# Patient Record
Sex: FEMALE | Race: WHITE | HISPANIC OR LATINO | Employment: OTHER | URBAN - METROPOLITAN AREA
[De-identification: names, ages, dates, MRNs, and addresses within clinical notes are randomized per-mention and may not be internally consistent; named-entity substitution may affect disease eponyms.]

---

## 2018-06-05 ENCOUNTER — HOSPITAL ENCOUNTER (INPATIENT)
Facility: HOSPITAL | Age: 58
LOS: 2 days | Discharge: HOME | DRG: 247 | End: 2018-06-07
Attending: EMERGENCY MEDICINE | Admitting: INTERNAL MEDICINE
Payer: COMMERCIAL

## 2018-06-05 DIAGNOSIS — R11.2 NAUSEA AND VOMITING, INTRACTABILITY OF VOMITING NOT SPECIFIED, UNSPECIFIED VOMITING TYPE: ICD-10-CM

## 2018-06-05 DIAGNOSIS — R00.1 BRADYCARDIA: ICD-10-CM

## 2018-06-05 DIAGNOSIS — I21.4 NSTEMI (NON-ST ELEVATED MYOCARDIAL INFARCTION): Primary | ICD-10-CM

## 2018-06-05 DIAGNOSIS — Z72.0 TOBACCO ABUSE: ICD-10-CM

## 2018-06-05 DIAGNOSIS — R07.9 CHEST PAIN, UNSPECIFIED TYPE: ICD-10-CM

## 2018-06-05 DIAGNOSIS — E11.9 TYPE 2 DIABETES MELLITUS WITHOUT COMPLICATION, WITHOUT LONG-TERM CURRENT USE OF INSULIN: ICD-10-CM

## 2018-06-05 DIAGNOSIS — R53.83 OTHER FATIGUE: ICD-10-CM

## 2018-06-05 DIAGNOSIS — E11.9 TYPE 2 DIABETES MELLITUS: ICD-10-CM

## 2018-06-05 DIAGNOSIS — R07.9 CHEST PAIN: ICD-10-CM

## 2018-06-05 DIAGNOSIS — R79.89 ELEVATED TROPONIN: ICD-10-CM

## 2018-06-05 DIAGNOSIS — R73.9 HYPERGLYCEMIA: ICD-10-CM

## 2018-06-05 DIAGNOSIS — R00.1 SYMPTOMATIC BRADYCARDIA: ICD-10-CM

## 2018-06-05 DIAGNOSIS — I25.10 CORONARY ARTERY DISEASE INVOLVING NATIVE CORONARY ARTERY, ANGINA PRESENCE UNSPECIFIED, UNSPECIFIED WHETHER NATIVE OR TRANSPLANTED HEART: ICD-10-CM

## 2018-06-05 DIAGNOSIS — R55 NEAR SYNCOPE: ICD-10-CM

## 2018-06-05 LAB
ALBUMIN SERPL BCP-MCNC: 4.1 G/DL
ALP SERPL-CCNC: 137 U/L
ALT SERPL W/O P-5'-P-CCNC: 19 U/L
ANION GAP SERPL CALC-SCNC: 12 MMOL/L
AST SERPL-CCNC: 17 U/L
B-OH-BUTYR BLD STRIP-SCNC: 0.1 MMOL/L
BACTERIA #/AREA URNS HPF: NORMAL /HPF
BILIRUB SERPL-MCNC: 0.5 MG/DL
BILIRUB UR QL STRIP: NEGATIVE
BNP SERPL-MCNC: 19 PG/ML
BUN SERPL-MCNC: 17 MG/DL
CALCIUM SERPL-MCNC: 10.2 MG/DL
CHLORIDE SERPL-SCNC: 99 MMOL/L
CK SERPL-CCNC: 163 U/L
CLARITY UR: CLEAR
CO2 SERPL-SCNC: 24 MMOL/L
COLOR UR: YELLOW
CREAT SERPL-MCNC: 0.9 MG/DL
DIASTOLIC DYSFUNCTION: NO
ERYTHROCYTE [DISTWIDTH] IN BLOOD BY AUTOMATED COUNT: 12.3 %
EST. GFR  (AFRICAN AMERICAN): >60 ML/MIN/1.73 M^2
EST. GFR  (NON AFRICAN AMERICAN): >60 ML/MIN/1.73 M^2
ESTIMATED AVG GLUCOSE: 220 MG/DL
ESTIMATED PA SYSTOLIC PRESSURE: 6.39
GLUCOSE SERPL-MCNC: 314 MG/DL (ref 70–110)
GLUCOSE SERPL-MCNC: 535 MG/DL
GLUCOSE UR QL STRIP: ABNORMAL
HBA1C MFR BLD HPLC: 9.3 %
HCT VFR BLD AUTO: 44 %
HGB BLD-MCNC: 14.6 G/DL
HGB UR QL STRIP: ABNORMAL
KETONES UR QL STRIP: NEGATIVE
LEUKOCYTE ESTERASE UR QL STRIP: NEGATIVE
MCH RBC QN AUTO: 31.8 PG
MCHC RBC AUTO-ENTMCNC: 33.2 G/DL
MCV RBC AUTO: 96 FL
MICROSCOPIC COMMENT: NORMAL
MITRAL VALVE MOBILITY: NORMAL
NITRITE UR QL STRIP: NEGATIVE
PH UR STRIP: 6 [PH] (ref 5–8)
PLATELET # BLD AUTO: 255 K/UL
PMV BLD AUTO: 10 FL
POCT GLUCOSE: 188 MG/DL (ref 70–110)
POCT GLUCOSE: 233 MG/DL (ref 70–110)
POCT GLUCOSE: 314 MG/DL (ref 70–110)
POTASSIUM SERPL-SCNC: 4.3 MMOL/L
PROT SERPL-MCNC: 7.5 G/DL
PROT UR QL STRIP: NEGATIVE
RBC # BLD AUTO: 4.59 M/UL
RBC #/AREA URNS HPF: 2 /HPF (ref 0–4)
RETIRED EF AND QEF - SEE NOTES: 65 (ref 55–65)
SODIUM SERPL-SCNC: 135 MMOL/L
SP GR UR STRIP: 1.01 (ref 1–1.03)
TRICUSPID VALVE REGURGITATION: NORMAL
TROPONIN I SERPL DL<=0.01 NG/ML-MCNC: 0.12 NG/ML
TROPONIN I SERPL DL<=0.01 NG/ML-MCNC: 0.78 NG/ML
TROPONIN I SERPL DL<=0.01 NG/ML-MCNC: 1.79 NG/ML
URN SPEC COLLECT METH UR: ABNORMAL
UROBILINOGEN UR STRIP-ACNC: NEGATIVE EU/DL
WBC # BLD AUTO: 9.73 K/UL
YEAST URNS QL MICRO: NORMAL

## 2018-06-05 PROCEDURE — 83036 HEMOGLOBIN GLYCOSYLATED A1C: CPT

## 2018-06-05 PROCEDURE — 84484 ASSAY OF TROPONIN QUANT: CPT

## 2018-06-05 PROCEDURE — 93306 TTE W/DOPPLER COMPLETE: CPT | Mod: 26,,, | Performed by: INTERNAL MEDICINE

## 2018-06-05 PROCEDURE — 82550 ASSAY OF CK (CPK): CPT

## 2018-06-05 PROCEDURE — 93010 ELECTROCARDIOGRAM REPORT: CPT | Mod: ,,, | Performed by: INTERNAL MEDICINE

## 2018-06-05 PROCEDURE — 85027 COMPLETE CBC AUTOMATED: CPT

## 2018-06-05 PROCEDURE — 82962 GLUCOSE BLOOD TEST: CPT

## 2018-06-05 PROCEDURE — 93306 TTE W/DOPPLER COMPLETE: CPT

## 2018-06-05 PROCEDURE — 25000003 PHARM REV CODE 250: Performed by: EMERGENCY MEDICINE

## 2018-06-05 PROCEDURE — 82010 KETONE BODYS QUAN: CPT

## 2018-06-05 PROCEDURE — 81000 URINALYSIS NONAUTO W/SCOPE: CPT

## 2018-06-05 PROCEDURE — 25000003 PHARM REV CODE 250: Performed by: NURSE PRACTITIONER

## 2018-06-05 PROCEDURE — 83880 ASSAY OF NATRIURETIC PEPTIDE: CPT

## 2018-06-05 PROCEDURE — 80053 COMPREHEN METABOLIC PANEL: CPT

## 2018-06-05 PROCEDURE — 96360 HYDRATION IV INFUSION INIT: CPT

## 2018-06-05 PROCEDURE — 20000000 HC ICU ROOM

## 2018-06-05 PROCEDURE — 99285 EMERGENCY DEPT VISIT HI MDM: CPT | Mod: 25

## 2018-06-05 PROCEDURE — 93005 ELECTROCARDIOGRAM TRACING: CPT

## 2018-06-05 PROCEDURE — 96361 HYDRATE IV INFUSION ADD-ON: CPT

## 2018-06-05 PROCEDURE — 84484 ASSAY OF TROPONIN QUANT: CPT | Mod: 91

## 2018-06-05 PROCEDURE — 63600175 PHARM REV CODE 636 W HCPCS: Performed by: NURSE PRACTITIONER

## 2018-06-05 RX ORDER — INSULIN ASPART 100 [IU]/ML
0-5 INJECTION, SOLUTION INTRAVENOUS; SUBCUTANEOUS
Status: DISCONTINUED | OUTPATIENT
Start: 2018-06-05 | End: 2018-06-07 | Stop reason: HOSPADM

## 2018-06-05 RX ORDER — PANTOPRAZOLE SODIUM 40 MG/1
40 TABLET, DELAYED RELEASE ORAL DAILY
Status: DISCONTINUED | OUTPATIENT
Start: 2018-06-06 | End: 2018-06-07 | Stop reason: HOSPADM

## 2018-06-05 RX ORDER — ATROPINE SULFATE 0.1 MG/ML
INJECTION INTRAVENOUS
Status: DISCONTINUED
Start: 2018-06-05 | End: 2018-06-05 | Stop reason: WASHOUT

## 2018-06-05 RX ORDER — SODIUM CHLORIDE 0.9 % (FLUSH) 0.9 %
3 SYRINGE (ML) INJECTION
Status: DISCONTINUED | OUTPATIENT
Start: 2018-06-05 | End: 2018-06-07 | Stop reason: HOSPADM

## 2018-06-05 RX ORDER — SODIUM CHLORIDE 9 MG/ML
INJECTION, SOLUTION INTRAVENOUS CONTINUOUS
Status: DISCONTINUED | OUTPATIENT
Start: 2018-06-05 | End: 2018-06-06

## 2018-06-05 RX ORDER — IBUPROFEN 200 MG
24 TABLET ORAL
Status: DISCONTINUED | OUTPATIENT
Start: 2018-06-05 | End: 2018-06-07 | Stop reason: HOSPADM

## 2018-06-05 RX ORDER — SODIUM CHLORIDE 9 MG/ML
1000 INJECTION, SOLUTION INTRAVENOUS
Status: COMPLETED | OUTPATIENT
Start: 2018-06-05 | End: 2018-06-05

## 2018-06-05 RX ORDER — GLUCAGON 1 MG
1 KIT INJECTION
Status: DISCONTINUED | OUTPATIENT
Start: 2018-06-05 | End: 2018-06-07 | Stop reason: HOSPADM

## 2018-06-05 RX ORDER — ATROPINE SULFATE 0.4 MG/ML
0.4 INJECTION, SOLUTION ENDOTRACHEAL; INTRAMEDULLARY; INTRAMUSCULAR; INTRAVENOUS; SUBCUTANEOUS EVERY 4 HOURS PRN
Status: DISCONTINUED | OUTPATIENT
Start: 2018-06-05 | End: 2018-06-07 | Stop reason: HOSPADM

## 2018-06-05 RX ORDER — IBUPROFEN 200 MG
16 TABLET ORAL
Status: DISCONTINUED | OUTPATIENT
Start: 2018-06-05 | End: 2018-06-07 | Stop reason: HOSPADM

## 2018-06-05 RX ORDER — POLYETHYLENE GLYCOL 3350 17 G/17G
17 POWDER, FOR SOLUTION ORAL 2 TIMES DAILY PRN
Status: DISCONTINUED | OUTPATIENT
Start: 2018-06-05 | End: 2018-06-07 | Stop reason: HOSPADM

## 2018-06-05 RX ORDER — ACETAMINOPHEN 325 MG/1
650 TABLET ORAL EVERY 4 HOURS PRN
Status: DISCONTINUED | OUTPATIENT
Start: 2018-06-05 | End: 2018-06-06

## 2018-06-05 RX ORDER — RAMELTEON 8 MG/1
8 TABLET ORAL NIGHTLY PRN
Status: DISCONTINUED | OUTPATIENT
Start: 2018-06-05 | End: 2018-06-07 | Stop reason: HOSPADM

## 2018-06-05 RX ADMIN — INSULIN ASPART 2 UNITS: 100 INJECTION, SOLUTION INTRAVENOUS; SUBCUTANEOUS at 05:06

## 2018-06-05 RX ADMIN — SODIUM CHLORIDE: 0.9 INJECTION, SOLUTION INTRAVENOUS at 06:06

## 2018-06-05 RX ADMIN — SODIUM CHLORIDE 1000 ML: 0.9 INJECTION, SOLUTION INTRAVENOUS at 02:06

## 2018-06-05 NOTE — ASSESSMENT & PLAN NOTE
-active smoker since age 20s  -reports 1/2ppd since that time  -discussed importance of complete smoking cessation

## 2018-06-05 NOTE — SUBJECTIVE & OBJECTIVE
No past medical history on file.    No past surgical history on file.    Review of patient's allergies indicates:  No Known Allergies    No current facility-administered medications on file prior to encounter.      No current outpatient prescriptions on file prior to encounter.     Family History     None        Social History Main Topics    Smoking status: Not on file    Smokeless tobacco: Not on file    Alcohol use Not on file    Drug use: Unknown    Sexual activity: Not on file     Review of Systems   Constitution: Negative for chills, decreased appetite, diaphoresis, fever and weakness.   Cardiovascular: Positive for chest pain. Negative for claudication, cyanosis, dyspnea on exertion, irregular heartbeat, leg swelling, near-syncope, orthopnea, palpitations, paroxysmal nocturnal dyspnea and syncope.   Respiratory: Negative for cough, hemoptysis, shortness of breath and wheezing.    Gastrointestinal: Negative for bloating, abdominal pain, constipation, diarrhea, melena, nausea and vomiting.   Neurological: Positive for dizziness.     Objective:     Vital Signs (Most Recent):  Temp: 98.3 °F (36.8 °C) (06/05/18 1301)  Pulse: (!) 48 (06/05/18 1430)  Resp: 16 (06/05/18 1430)  BP: (!) 95/53 (06/05/18 1430)  SpO2: 98 % (06/05/18 1430) Vital Signs (24h Range):  Temp:  [98.3 °F (36.8 °C)] 98.3 °F (36.8 °C)  Pulse:  [48-94] 48  Resp:  [16] 16  SpO2:  [98 %] 98 %  BP: ()/(53-67) 95/53     Weight: 59 kg (130 lb)  Body mass index is 21.63 kg/m².    SpO2: 98 %  O2 Device (Oxygen Therapy): room air    No intake or output data in the 24 hours ending 06/05/18 1526    Lines/Drains/Airways     Peripheral Intravenous Line                 Peripheral IV - Single Lumen 06/05/18 1301 Left Antecubital less than 1 day         Peripheral IV - Single Lumen 06/05/18 1305 Right Forearm less than 1 day                Physical Exam   Constitutional: She is oriented to person, place, and time. She appears well-developed and  well-nourished. No distress.   Cardiovascular: Normal rate and regular rhythm.  Exam reveals no gallop.    No murmur heard.  Pulmonary/Chest: Effort normal and breath sounds normal. No respiratory distress. She has no wheezes.   Abdominal: Soft. Bowel sounds are normal. She exhibits no distension. There is no tenderness.   Neurological: She is alert and oriented to person, place, and time.   Skin: Skin is warm and dry.       Significant Labs:       Recent Labs  Lab 06/05/18  1310   *   K 4.3   CL 99   CO2 24   BUN 17   CREATININE 0.9       Recent Labs  Lab 06/05/18  1310   WBC 9.73   RBC 4.59   HGB 14.6   HCT 44.0      MCV 96   MCH 31.8*   MCHC 33.2       Recent Labs  Lab 06/05/18  1310   TROPONINI 0.122*       Significant Imaging: Echocardiogram:   2D echo with color flow doppler:   Results for orders placed or performed during the hospital encounter of 06/05/18   2D echo with color flow doppler   Result Value Ref Range    EF 65 55 - 65    Diastolic Dysfunction No     Est. PA Systolic Pressure 6.39     Mitral Valve Mobility NORMAL     Tricuspid Valve Regurgitation TRIVIAL

## 2018-06-05 NOTE — ED PROVIDER NOTES
Encounter Date: 6/5/2018    SCRIBE #1 NOTE: I, Sushil Pham, am scribing for, and in the presence of,  Dr. Sheikh. I have scribed the entire note.       History     Chief Complaint   Patient presents with    Fatigue     EMS responded to scene after c/o weakness with near syncope. On arrival, found patient awake with HR 28. Responded to IV Atropine. On arrival, awake, alert, oriented. Denies pain.      Deanna Garcia is a 57 y.o. female who  has no past medical history on file.    The patient presents via EMS to the ED due to fatigue and near-syncope.    She reports symptoms first started yesterday, while driving. She felt weak and had a near syncopal, requiring her to pull over. She felt nauseated and started vomiting. She felt better and returned to work today. 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 She admits to light-headedness and dizziness but denies any associated palpitations, chest pain, SOB, abdominal pain, or back pain. Pt has no past history of medical problems but notes she does drink alot of coke and smokes cigarettes occasionally. She has had a hysterectomy in the past. Denies any known family history.    EMS was called to the scene, and she was noted to be significantly bradycardic, in 20s. She was given atropine with improvement in rate. There was concern for ST elevation on repeat EKG. Patient feels fine and denies any CP/SOB on arrival.        The history is provided by the patient.     Review of patient's allergies indicates:  No Known Allergies  No past medical history on file.  No past surgical history on file.  No family history on file.  Social History   Substance Use Topics    Smoking status: Not on file    Smokeless tobacco: Not on file    Alcohol use Not on file     Review of Systems   Constitutional: Positive for fatigue. Negative for chills and fever.   HENT: Negative for congestion, rhinorrhea and sore throat.    Eyes: Negative for redness and visual  disturbance.   Respiratory: Negative for cough, shortness of breath and wheezing.    Cardiovascular: Negative for chest pain and palpitations.   Gastrointestinal: Positive for nausea and vomiting. Negative for abdominal pain and diarrhea.   Genitourinary: Negative for dysuria, frequency, hematuria and urgency.   Musculoskeletal: Negative for back pain, myalgias and neck pain.   Skin: Negative for rash and wound.   Neurological: Positive for dizziness and light-headedness. Negative for syncope and weakness.        Near syncopal episodes   Hematological: Does not bruise/bleed easily.   Psychiatric/Behavioral: Negative for agitation, behavioral problems and confusion.   All other systems reviewed and are negative.      Physical Exam     Initial Vitals [06/05/18 1301]   BP Pulse Resp Temp SpO2   (!) 143/67 94 16 -- 98 %      MAP       92.33         Physical Exam    Nursing note and vitals reviewed.  Constitutional: She appears well-developed and well-nourished. She is not diaphoretic. No distress.   HENT:   Head: Normocephalic and atraumatic.   Mouth/Throat: Oropharynx is clear and moist.   Eyes: EOM are normal. Pupils are equal, round, and reactive to light.   Neck: Normal range of motion. Neck supple. No tracheal deviation present.   Cardiovascular: Normal rate, regular rhythm, normal heart sounds and intact distal pulses. Exam reveals no gallop and no friction rub.    No murmur heard.  Pulmonary/Chest: Breath sounds normal. No stridor. No respiratory distress. She has no wheezes. She has no rhonchi. She has no rales.   Abdominal: Soft. Bowel sounds are normal. She exhibits no distension and no mass. There is no tenderness. There is no rebound and no guarding.   Musculoskeletal: Normal range of motion. She exhibits no edema or tenderness.   Neurological: She is alert and oriented to person, place, and time. She has normal strength.   Skin: Skin is warm and dry.   Psychiatric: She has a normal mood and affect. Thought  content normal.         ED Course   Procedures  Labs Reviewed   CBC WITHOUT DIFFERENTIAL - Abnormal; Notable for the following:        Result Value    MCH 31.8 (*)     All other components within normal limits   COMPREHENSIVE METABOLIC PANEL - Abnormal; Notable for the following:     Sodium 135 (*)     Glucose 535 (*)     Alkaline Phosphatase 137 (*)     All other components within normal limits    Narrative:     glu   critical result(s) called and verbal readback obtained from   coote rn, 06/05/2018 13:57   URINALYSIS - Abnormal; Notable for the following:     Glucose, UA 3+ (*)     Occult Blood UA Trace (*)     All other components within normal limits   TROPONIN I - Abnormal; Notable for the following:     Troponin I 0.122 (*)     All other components within normal limits   POCT GLUCOSE MONITORING CONTINUOUS - Abnormal; Notable for the following:     POC Glucose 314 (*)     All other components within normal limits   POCT GLUCOSE - Abnormal; Notable for the following:     POCT Glucose 314 (*)     All other components within normal limits   B-TYPE NATRIURETIC PEPTIDE   BETA - HYDROXYBUTYRATE, SERUM   URINALYSIS MICROSCOPIC     EKG Readings: (Independently Interpreted)   EKG: Rate 94 bpm. NSR. Mild T wave flattening in aVL. Otherwise no ST changes or signs of ischemia. No prior EKG for comparison.              Medical Decision Making:   Initial Assessment:   58 y/o female presents via EMS due to near syncopal episode yesterday. 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 When EMS arrived she had a low heart rate which resolved after being given Atropine. However initial EMS EKG was a concern for STEMI. On arrival patient's vitals are stable she denies any CP or SOB. Repeat EKG obtained which showed no STEMI so no STEMI was activated. Plan to obtain cardiac evaluation, basic labs, and reassess.   Differential Diagnosis:   Differential Diagnosis includes, but is not limited to:  Arrhythmia, aortic  dissection, MI/unstable angina, PE, cardiogenic shock, CHF, CVA/TIA, intracranial lesion/mass, seizure, perforated viscous, ruptured AAA, orthostatic hypotension, vasovagal episode, anemia, dehydration, medication reaction, intentional overdose    Clinical Tests:   Lab Tests: Ordered and Reviewed  Medical Tests: Ordered and Reviewed  ED Management:  Dr. Marie at bedside, who evaluated repeat EKG and agrees no STEMI activation.     Labs revealed hyperglycemia without ketones or acidosis. No evidence of DKA currently.  Troponin mildly elevated at 0.12.     Discussed case with Dr. Marie, who feels likely elevated from demand, no acute intervention neccesary at this time.  Dr. Marie will admit to the cardiology service in ICU for further monitoring of symptomatic bradycardia.  Upon re-evaluation, the patient's status has improved.  At this time, I believe the patient should be admitted to the hospital for further evaluation and management of symptomatic bradycardia.    Dr. Marie with Cardiology service was contacted and the case was discussed. The consulting physician agrees with plan and will admit under their service. Additional recommendations at this time: none    The patient and family were updated with test results, overall impression, and further plan of care. All questions were answered. The patient expressed understanding and agreed with the current plan.                  Attending Attestation:         Attending Critical Care:   Critical Care Times:   Direct Patient Care (initial evaluation, reassessments, and time considering the case)................................................................15 minutes.   Additional History from reviewing old medical records or taking additional history from the family, EMS, PCP, etc.......................10 minutes.   Ordering, Reviewing, and Interpreting Diagnostic  Studies...............................................................................................................10 minutes.   Documentation..................................................................................................................................................................................15 minutes.   Consultation with other Physicians. .................................................................................................................................................10 minutes.   Consultation with the patient's family directly relating to the patient's condition, care, and DNR status (when patient unable)......5 minutes.   ==============================================================  · Total Critical Care Time - exclusive of procedural time: 65 minutes.  ==============================================================  Critical care was necessary to treat or prevent imminent or life-threatening deterioration of the following conditions: cardiac arrhythmia.   Critical care was time spent personally by me on the following activities: re-evaluation of patient's conition, discussion with consultants, evaluation of patient's response to treatment, development of treatment plan with patient or relative, ordering lab, x-rays, and/or EKG, examination of patient and obtaining history from patient or relative.   Critical Care Condition: critical                  Clinical Impression:     1. Symptomatic bradycardia    2. Near syncope    3. Nausea and vomiting, intractability of vomiting not specified, unspecified vomiting type    4. Other fatigue    5. Chest pain          No orders to display             I, Dr. Naldo Sheikh, personally performed the services described in this documentation. All medical record entries made by the scribe were at my direction and in my presence.  I have reviewed the chart and agree that the record reflects my personal performance and is accurate and  complete.     Naldo Sheikh MD.                 Naldo Sheikh MD  06/09/18 3007

## 2018-06-05 NOTE — H&P
[facility]  Cardiology  History and Physical     Patient Name: Deanna Garcia  MRN: [id number]  Admission Date: 6/5/2018  Code Status: No Order   Attending Provider: Naldo Sheikh MD   Primary Care Physician: Naldo Geiger MD  Principal Problem:<principal problem not specified>    Patient information was obtained from patient and past medical records.     Subjective:     Chief Complaint:  Chest pain and weakness      HPI:  58yo female with no significant past medical history who presented to the ER with complaints of weakness and chest pain. Ms. Garcia complains of feeling weak and dizzy while at work. She attributed it to the heat and returned to her car. Once in her car, her dizziness worsened and she began vomiting and noting blurred vision. She drove home, rested and took a shower with improvement in her symptoms. Her daughter wanted to bring her to the ER but she refused since she was feeling better. She proceeded with the rest of her day and denies any recurrent symptoms. She awoke today in her [employer] and proceeded to work. She complained of left sided chest pain that radiated to her arm and was accompanied by SOB and mild diaphoresis. The pain lasted about 15 minutes and resolved spontaneously. Approximately 30 minutes later the pain recurred but was not more intense and had a similar course. She alerted her daughter who called EMS. Upon arrival of EMS, EKG reviewed HR in the 20s and was brought to [facility] for further evaluation     Hospital Course  6/5/2018 Presented to the ER with complaints of weakness and chest pain. Initial EKG per EMS with HR 27 with appearance of SB. Given Atropine with improvement in HR. Brought to the ER with reports of recurrent HR down to 40s and currently 60s with stable BP during cardiology assessment. EKG with SR normal axis and no STTWC. CBC WNL. BMP WNL with the exception of glucose 535. Troponin .122 with BNP 19. Echocardiogram with normal LVEF with no  WMA   No past medical history on file.    No past surgical history on file.    Review of patient's allergies indicates:  No Known Allergies    No current facility-administered medications on file prior to encounter.      No current outpatient prescriptions on file prior to encounter.     Family History     None        Social History Main Topics    Smoking status: Not on file    Smokeless tobacco: Not on file    Alcohol use Not on file    Drug use: Unknown    Sexual activity: Not on file     Review of Systems   Constitution: Negative for chills, decreased appetite, diaphoresis, fever and weakness.   Cardiovascular: Positive for chest pain. Negative for claudication, cyanosis, dyspnea on exertion, irregular heartbeat, leg swelling, near-syncope, orthopnea, palpitations, paroxysmal nocturnal dyspnea and syncope.   Respiratory: Negative for cough, hemoptysis, shortness of breath and wheezing.    Gastrointestinal: Negative for bloating, abdominal pain, constipation, diarrhea, melena, nausea and vomiting.   Neurological: Positive for dizziness.     Objective:     Vital Signs (Most Recent):  Temp: 98.3 °F (36.8 °C) (06/05/18 1301)  Pulse: (!) 48 (06/05/18 1430)  Resp: 16 (06/05/18 1430)  BP: (!) 95/53 (06/05/18 1430)  SpO2: 98 % (06/05/18 1430) Vital Signs (24h Range):  Temp:  [98.3 °F (36.8 °C)] 98.3 °F (36.8 °C)  Pulse:  [48-94] 48  Resp:  [16] 16  SpO2:  [98 %] 98 %  BP: ()/(53-67) 95/53     Weight: 59 kg (130 lb)  Body mass index is 21.63 kg/m².    SpO2: 98 %  O2 Device (Oxygen Therapy): room air    No intake or output data in the 24 hours ending 06/05/18 1526    Lines/Drains/Airways     Peripheral Intravenous Line                 Peripheral IV - Single Lumen 06/05/18 1301 Left Antecubital less than 1 day         Peripheral IV - Single Lumen 06/05/18 1305 Right Forearm less than 1 day                Physical Exam   Constitutional: She is oriented to person, place, and time. She appears well-developed and  well-nourished. No distress.   Cardiovascular: Normal rate and regular rhythm.  Exam reveals no gallop.    No murmur heard.  Pulmonary/Chest: Effort normal and breath sounds normal. No respiratory distress. She has no wheezes.   Abdominal: Soft. Bowel sounds are normal. She exhibits no distension. There is no tenderness.   Neurological: She is alert and oriented to person, place, and time.   Skin: Skin is warm and dry.       Significant Labs:       Recent Labs  Lab 06/05/18  1310   *   K 4.3   CL 99   CO2 24   BUN 17   CREATININE 0.9       Recent Labs  Lab 06/05/18  1310   WBC 9.73   RBC 4.59   HGB 14.6   HCT 44.0      MCV 96   MCH 31.8*   MCHC 33.2       Recent Labs  Lab 06/05/18  1310   TROPONINI 0.122*       Significant Imaging: Echocardiogram:   2D echo with color flow doppler:   Results for orders placed or performed during the hospital encounter of 06/05/18   2D echo with color flow doppler   Result Value Ref Range    EF 65 55 - 65    Diastolic Dysfunction No     Est. PA Systolic Pressure 6.39     Mitral Valve Mobility NORMAL     Tricuspid Valve Regurgitation TRIVIAL      Assessment and Plan:     Hyperglycemia    -glucose 535 on admit BMP  -repeat POCT glucose  -beta hydroxybutyrate .1  -serum anion gap 12  -HgbA1c pending  -will order SSI prn         Tobacco abuse    -active smoker since age 20s  -reports 1/2ppd since that time  -discussed importance of complete smoking cessation        Chest pain, unspecified    -complaints of chest pain with associated symptoms  -EKG with no acute changes  -initial troponin .122; repeat troponin pending  -echo with normal LVEF and no WMA  -will trend Nicolette and EKGs  -risk factors for CAD: tobacco abuse and age         Symptomatic bradycardia    -complaints of dizziness and weakness  -initial EKG with HR 27 per EMS improved after Atropine use  -HR currently 60s  -BP stable  -not on any CCB or BB at home  -no AVB noted thus far on EKGs or telemetry in the  ER  -concerned about symptomatic bradycardia vs ischemia response given chest pain  -will admit to ICU for close monitoring; will order Atropine for prn use; if recurrent bradycardia with hypotension will plan to start on IV Dopamine drip  -serial EKGs and CE; echo with normal LVEF             VTE Risk Mitigation     None          REILLY Conroy, ANP  Cardiology   [facility]

## 2018-06-05 NOTE — ASSESSMENT & PLAN NOTE
-complaints of chest pain with associated symptoms  -EKG with no acute changes  -initial troponin .122; repeat troponin pending  -echo with normal LVEF and no WMA  -will trend Nicolette and EKGs  -risk factors for CAD: tobacco abuse and age

## 2018-06-05 NOTE — HPI
58yo female with no significant past medical history who presented to the ER with complaints of weakness and chest pain. Ms. Garcia complains of feeling weak and dizzy while at work. She attributed it to the heat and returned to her car. Once in her car, her dizziness worsened and she began vomiting and noting blurred vision. She drove home, rested and took a shower with improvement in her symptoms. Her daughter wanted to bring her to the ER but she refused since she was feeling better. She proceeded with the rest of her day and denies any recurrent symptoms. She awoke today in her [employer] and proceeded to work. She complained of left sided chest pain that radiated to her arm and was accompanied by SOB and mild diaphoresis. The pain lasted about 15 minutes and resolved spontaneously. Approximately 30 minutes later the pain recurred but was not more intense and had a similar course. She alerted her daughter who called EMS. Upon arrival of EMS, EKG reviewed HR in the 20s and was brought to [facility] for further evaluation

## 2018-06-05 NOTE — ASSESSMENT & PLAN NOTE
-complaints of dizziness and weakness  -initial EKG with HR 27 per EMS improved after Atropine use  -HR currently 60s  -BP stable  -not on any CCB or BB at home  -no AVB noted thus far on EKGs or telemetry in the ER  -concerned about symptomatic bradycardia vs ischemia response given chest pain  -will admit to ICU for close monitoring; will order Atropine for prn use; if recurrent bradycardia with hypotension will plan to start on IV Dopamine drip  -serial EKGs and CE; echo with normal LVEF

## 2018-06-05 NOTE — ASSESSMENT & PLAN NOTE
-glucose 535 on admit BMP  -repeat POCT glucose  -beta hydroxybutyrate .1  -serum anion gap 12  -HgbA1c pending  -will order SSI prn

## 2018-06-05 NOTE — HOSPITAL COURSE
6/5/2018 Presented to the ER with complaints of weakness and chest pain. Initial EKG per EMS with HR 27 with appearance of SB. Given Atropine with improvement in HR. Brought to the ER with reports of recurrent HR down to 40s and currently 60s with stable BP during cardiology assessment. EKG with SR normal axis and no STTWC. CBC WNL. BMP WNL with the exception of glucose 535. Troponin .122 with BNP 19. Echocardiogram with normal LVEF with no WMA   6/6/2018 HR 58-70 overnight with occasional rates down to 40s. No recurrent HR in the 20s noted on telemetry overnight. HgbA1c 9.3. CBGs 188-314 overnight.Troponin trended with trend up to .780-1.787 and 8.607 this AM. EKG with NSR and no STTWC. Loaded with ASA and Plavix. Remained NPO for [facility] today

## 2018-06-06 PROBLEM — I21.4 NSTEMI (NON-ST ELEVATED MYOCARDIAL INFARCTION): Status: ACTIVE | Noted: 2018-06-05

## 2018-06-06 PROBLEM — E11.9 TYPE 2 DIABETES MELLITUS: Status: ACTIVE | Noted: 2018-06-06

## 2018-06-06 LAB
ALBUMIN SERPL BCP-MCNC: 3.2 G/DL
ALP SERPL-CCNC: 96 U/L
ALT SERPL W/O P-5'-P-CCNC: 20 U/L
ANION GAP SERPL CALC-SCNC: 6 MMOL/L
AST SERPL-CCNC: 48 U/L
BASOPHILS # BLD AUTO: 0.02 K/UL
BASOPHILS NFR BLD: 0.2 %
BILIRUB SERPL-MCNC: 0.4 MG/DL
BUN SERPL-MCNC: 17 MG/DL
CALCIUM SERPL-MCNC: 8.8 MG/DL
CHLORIDE SERPL-SCNC: 107 MMOL/L
CHOLEST SERPL-MCNC: 189 MG/DL
CHOLEST/HDLC SERPL: 5.4 {RATIO}
CK SERPL-CCNC: 375 U/L
CO2 SERPL-SCNC: 24 MMOL/L
CREAT SERPL-MCNC: 0.7 MG/DL
DIFFERENTIAL METHOD: ABNORMAL
EOSINOPHIL # BLD AUTO: 0.1 K/UL
EOSINOPHIL NFR BLD: 0.7 %
ERYTHROCYTE [DISTWIDTH] IN BLOOD BY AUTOMATED COUNT: 12.3 %
EST. GFR  (AFRICAN AMERICAN): >60 ML/MIN/1.73 M^2
EST. GFR  (NON AFRICAN AMERICAN): >60 ML/MIN/1.73 M^2
GLUCOSE SERPL-MCNC: 256 MG/DL
HCT VFR BLD AUTO: 38.2 %
HDLC SERPL-MCNC: 35 MG/DL
HDLC SERPL: 18.5 %
HGB BLD-MCNC: 13.2 G/DL
LDLC SERPL CALC-MCNC: 125.4 MG/DL
LYMPHOCYTES # BLD AUTO: 2.7 K/UL
LYMPHOCYTES NFR BLD: 21.5 %
MCH RBC QN AUTO: 32.8 PG
MCHC RBC AUTO-ENTMCNC: 34.6 G/DL
MCV RBC AUTO: 95 FL
MONOCYTES # BLD AUTO: 0.7 K/UL
MONOCYTES NFR BLD: 5.5 %
NEUTROPHILS # BLD AUTO: 9 K/UL
NEUTROPHILS NFR BLD: 71.8 %
NONHDLC SERPL-MCNC: 154 MG/DL
PLATELET # BLD AUTO: 229 K/UL
PMV BLD AUTO: 9.8 FL
POCT GLUCOSE: 185 MG/DL (ref 70–110)
POCT GLUCOSE: 230 MG/DL (ref 70–110)
POCT GLUCOSE: 259 MG/DL (ref 70–110)
POCT GLUCOSE: 98 MG/DL (ref 70–110)
POTASSIUM SERPL-SCNC: 3.7 MMOL/L
PROT SERPL-MCNC: 5.7 G/DL
RBC # BLD AUTO: 4.02 M/UL
SODIUM SERPL-SCNC: 137 MMOL/L
TRIGL SERPL-MCNC: 143 MG/DL
TROPONIN I SERPL DL<=0.01 NG/ML-MCNC: 8.61 NG/ML
WBC # BLD AUTO: 12.53 K/UL

## 2018-06-06 PROCEDURE — 93010 ELECTROCARDIOGRAM REPORT: CPT | Mod: 77,,, | Performed by: INTERNAL MEDICINE

## 2018-06-06 PROCEDURE — 92928 PRQ TCAT PLMT NTRAC ST 1 LES: CPT | Mod: RC,,, | Performed by: INTERNAL MEDICINE

## 2018-06-06 PROCEDURE — 99152 MOD SED SAME PHYS/QHP 5/>YRS: CPT | Mod: ,,, | Performed by: INTERNAL MEDICINE

## 2018-06-06 PROCEDURE — 36415 COLL VENOUS BLD VENIPUNCTURE: CPT

## 2018-06-06 PROCEDURE — 4A023N7 MEASUREMENT OF CARDIAC SAMPLING AND PRESSURE, LEFT HEART, PERCUTANEOUS APPROACH: ICD-10-PCS | Performed by: INTERNAL MEDICINE

## 2018-06-06 PROCEDURE — 80061 LIPID PANEL: CPT

## 2018-06-06 PROCEDURE — 84484 ASSAY OF TROPONIN QUANT: CPT

## 2018-06-06 PROCEDURE — 80053 COMPREHEN METABOLIC PANEL: CPT

## 2018-06-06 PROCEDURE — 63600175 PHARM REV CODE 636 W HCPCS

## 2018-06-06 PROCEDURE — 93005 ELECTROCARDIOGRAM TRACING: CPT

## 2018-06-06 PROCEDURE — B215YZZ FLUOROSCOPY OF LEFT HEART USING OTHER CONTRAST: ICD-10-PCS | Performed by: INTERNAL MEDICINE

## 2018-06-06 PROCEDURE — 94761 N-INVAS EAR/PLS OXIMETRY MLT: CPT

## 2018-06-06 PROCEDURE — B240ZZ3 ULTRASONOGRAPHY OF SINGLE CORONARY ARTERY, INTRAVASCULAR: ICD-10-PCS | Performed by: INTERNAL MEDICINE

## 2018-06-06 PROCEDURE — 27000221 HC OXYGEN, UP TO 24 HOURS

## 2018-06-06 PROCEDURE — 4A033BC MEASUREMENT OF ARTERIAL PRESSURE, CORONARY, PERCUTANEOUS APPROACH: ICD-10-PCS | Performed by: INTERNAL MEDICINE

## 2018-06-06 PROCEDURE — 25000003 PHARM REV CODE 250: Performed by: INTERNAL MEDICINE

## 2018-06-06 PROCEDURE — 93458 L HRT ARTERY/VENTRICLE ANGIO: CPT | Mod: 26,59,, | Performed by: INTERNAL MEDICINE

## 2018-06-06 PROCEDURE — 027034Z DILATION OF CORONARY ARTERY, ONE ARTERY WITH DRUG-ELUTING INTRALUMINAL DEVICE, PERCUTANEOUS APPROACH: ICD-10-PCS | Performed by: INTERNAL MEDICINE

## 2018-06-06 PROCEDURE — 93571 IV DOP VEL&/PRESS C FLO 1ST: CPT | Mod: 26,52,, | Performed by: INTERNAL MEDICINE

## 2018-06-06 PROCEDURE — 20000000 HC ICU ROOM

## 2018-06-06 PROCEDURE — B211YZZ FLUOROSCOPY OF MULTIPLE CORONARY ARTERIES USING OTHER CONTRAST: ICD-10-PCS | Performed by: INTERNAL MEDICINE

## 2018-06-06 PROCEDURE — 25500020 PHARM REV CODE 255

## 2018-06-06 PROCEDURE — 93010 ELECTROCARDIOGRAM REPORT: CPT | Mod: ,,, | Performed by: INTERNAL MEDICINE

## 2018-06-06 PROCEDURE — 82550 ASSAY OF CK (CPK): CPT

## 2018-06-06 PROCEDURE — 92978 ENDOLUMINL IVUS OCT C 1ST: CPT | Mod: 26,,, | Performed by: INTERNAL MEDICINE

## 2018-06-06 PROCEDURE — 85025 COMPLETE CBC W/AUTO DIFF WBC: CPT

## 2018-06-06 PROCEDURE — 27200085 CATH LAB PROCEDURE

## 2018-06-06 PROCEDURE — 92921 CATH LAB PROCEDURE: CPT | Mod: RC,,, | Performed by: INTERNAL MEDICINE

## 2018-06-06 PROCEDURE — 25000003 PHARM REV CODE 250

## 2018-06-06 PROCEDURE — 25000003 PHARM REV CODE 250: Performed by: NURSE PRACTITIONER

## 2018-06-06 RX ORDER — ACETAMINOPHEN 325 MG/1
650 TABLET ORAL EVERY 4 HOURS PRN
Status: DISCONTINUED | OUTPATIENT
Start: 2018-06-06 | End: 2018-06-07 | Stop reason: HOSPADM

## 2018-06-06 RX ORDER — ASPIRIN 81 MG/1
81 TABLET ORAL DAILY
Status: DISCONTINUED | OUTPATIENT
Start: 2018-06-07 | End: 2018-06-07 | Stop reason: HOSPADM

## 2018-06-06 RX ORDER — CLOPIDOGREL BISULFATE 75 MG/1
600 TABLET ORAL ONCE
Status: COMPLETED | OUTPATIENT
Start: 2018-06-06 | End: 2018-06-06

## 2018-06-06 RX ORDER — ONDANSETRON 8 MG/1
8 TABLET, ORALLY DISINTEGRATING ORAL EVERY 8 HOURS PRN
Status: DISCONTINUED | OUTPATIENT
Start: 2018-06-06 | End: 2018-06-07 | Stop reason: HOSPADM

## 2018-06-06 RX ORDER — ATORVASTATIN CALCIUM 40 MG/1
80 TABLET, FILM COATED ORAL DAILY
Status: DISCONTINUED | OUTPATIENT
Start: 2018-06-06 | End: 2018-06-07 | Stop reason: HOSPADM

## 2018-06-06 RX ORDER — ASPIRIN 325 MG
325 TABLET ORAL ONCE
Status: COMPLETED | OUTPATIENT
Start: 2018-06-06 | End: 2018-06-06

## 2018-06-06 RX ORDER — SODIUM CHLORIDE 9 MG/ML
3 INJECTION, SOLUTION INTRAVENOUS CONTINUOUS
Status: ACTIVE | OUTPATIENT
Start: 2018-06-06 | End: 2018-06-06

## 2018-06-06 RX ORDER — DIPHENHYDRAMINE HCL 50 MG
50 CAPSULE ORAL ONCE
Status: COMPLETED | OUTPATIENT
Start: 2018-06-06 | End: 2018-06-06

## 2018-06-06 RX ORDER — CLOPIDOGREL BISULFATE 75 MG/1
75 TABLET ORAL DAILY
Status: DISCONTINUED | OUTPATIENT
Start: 2018-06-07 | End: 2018-06-07 | Stop reason: HOSPADM

## 2018-06-06 RX ADMIN — PANTOPRAZOLE SODIUM 40 MG: 40 TABLET, DELAYED RELEASE ORAL at 10:06

## 2018-06-06 RX ADMIN — CLOPIDOGREL BISULFATE 600 MG: 75 TABLET ORAL at 10:06

## 2018-06-06 RX ADMIN — SODIUM CHLORIDE 3 ML/KG/HR: 0.9 INJECTION, SOLUTION INTRAVENOUS at 03:06

## 2018-06-06 RX ADMIN — SODIUM CHLORIDE: 0.9 INJECTION, SOLUTION INTRAVENOUS at 01:06

## 2018-06-06 RX ADMIN — INSULIN ASPART 1 UNITS: 100 INJECTION, SOLUTION INTRAVENOUS; SUBCUTANEOUS at 10:06

## 2018-06-06 RX ADMIN — DIPHENHYDRAMINE HYDROCHLORIDE 50 MG: 50 CAPSULE ORAL at 11:06

## 2018-06-06 RX ADMIN — RAMELTEON 8 MG: 8 TABLET, FILM COATED ORAL at 12:06

## 2018-06-06 RX ADMIN — ATORVASTATIN CALCIUM 80 MG: 40 TABLET, FILM COATED ORAL at 10:06

## 2018-06-06 RX ADMIN — INSULIN ASPART 3 UNITS: 100 INJECTION, SOLUTION INTRAVENOUS; SUBCUTANEOUS at 06:06

## 2018-06-06 RX ADMIN — ASPIRIN 325 MG ORAL TABLET 325 MG: 325 PILL ORAL at 10:06

## 2018-06-06 NOTE — ASSESSMENT & PLAN NOTE
-complaints of chest pain with associated symptoms concerning for ischemic etiology   -EKG with no acute changes  -troponin trending up   -NPO for [facility] today

## 2018-06-06 NOTE — ASSESSMENT & PLAN NOTE
-counseled on importance of complete smoking cessation-receptive to teaching  -will refer to smoking cessation clinic upon discharge

## 2018-06-06 NOTE — SUBJECTIVE & OBJECTIVE
Review of Systems   Constitution: Negative for chills, decreased appetite, diaphoresis, fever and weakness.   Cardiovascular: Negative for chest pain, claudication, cyanosis, dyspnea on exertion, irregular heartbeat, leg swelling, near-syncope, orthopnea, palpitations, paroxysmal nocturnal dyspnea and syncope.   Respiratory: Negative for cough, hemoptysis, shortness of breath and wheezing.    Gastrointestinal: Negative for bloating, abdominal pain, constipation, diarrhea, melena, nausea and vomiting.   Neurological: Negative for dizziness.     Objective:     Vital Signs (Most Recent):  Temp: 97.7 °F (36.5 °C) (06/06/18 0715)  Pulse: (!) 58 (06/06/18 0800)  Resp: (!) 26 (06/06/18 0800)  BP: (!) 114/58 (06/06/18 0800)  SpO2: (!) 94 % (06/06/18 0800) Vital Signs (24h Range):  Temp:  [97.7 °F (36.5 °C)-98.8 °F (37.1 °C)] 97.7 °F (36.5 °C)  Pulse:  [43-94] 58  Resp:  [15-44] 26  SpO2:  [90 %-99 %] 94 %  BP: ()/(48-67) 114/58     Weight: 56.9 kg (125 lb 7.1 oz)  Body mass index is 20.87 kg/m².     SpO2: (!) 94 %  O2 Device (Oxygen Therapy): (P) room air      Intake/Output Summary (Last 24 hours) at 06/06/18 1016  Last data filed at 06/06/18 0600   Gross per 24 hour   Intake          1526.67 ml   Output             1050 ml   Net           476.67 ml       Lines/Drains/Airways     Peripheral Intravenous Line                 Peripheral IV - Single Lumen 06/05/18 1301 Left Antecubital less than 1 day         Peripheral IV - Single Lumen 06/05/18 1305 Right Forearm less than 1 day                Physical Exam   Constitutional: She is oriented to person, place, and time. She appears well-developed and well-nourished. No distress.   Cardiovascular: Normal rate and regular rhythm.  Exam reveals no gallop.    No murmur heard.  Pulses:       Radial pulses are 2+ on the right side, and 2+ on the left side.   Pulmonary/Chest: Effort normal and breath sounds normal. No respiratory distress. She has no wheezes.   Abdominal:  Soft. Bowel sounds are normal. She exhibits no distension. There is no tenderness.   Neurological: She is alert and oriented to person, place, and time.   Skin: Skin is warm and dry.       Significant Labs:       Recent Labs  Lab 06/06/18  0343      K 3.7      CO2 24   BUN 17   CREATININE 0.7       Recent Labs  Lab 06/06/18  0343   WBC 12.53   RBC 4.02   HGB 13.2   HCT 38.2      MCV 95   MCH 32.8*   MCHC 34.6       Recent Labs  Lab 06/06/18  0343   *   TROPONINI 8.607*       Significant Imaging: Echocardiogram:   2D echo with color flow doppler:   Results for orders placed or performed during the hospital encounter of 06/05/18   2D echo with color flow doppler   Result Value Ref Range    EF 65 55 - 65    Diastolic Dysfunction No     Est. PA Systolic Pressure 6.39     Mitral Valve Mobility NORMAL     Tricuspid Valve Regurgitation TRIVIAL

## 2018-06-06 NOTE — CONSULTS
Consulted for Cardiac/ ADA diet education. RN reports pt just left unit for cath lab. RN asked if I could hold off on education till tomorrow.  RD will follow up tomorrow with diet education.

## 2018-06-06 NOTE — PROCEDURES
Post Procedure Note: PCI    The pt was brought to the cath lab and under sterile technique, right radial access was obtained without difficulty. Images were obtained in multiple views and severe distal RCA disease noted with successful IVUS guided PCI with KATI with excellent result. Please see full report for details. The pt tolerated the procedure well without complications. Radial band closure device used with successful hemostasis.     Vitals:    06/06/18 1345 06/06/18 1400 06/06/18 1415 06/06/18 1552   BP:  117/64     Pulse: 61 64 69    Resp: (!) 28 (!) 27 (!) 37    Temp:       TempSrc:       SpO2: 96% 95% 97% 95%   Weight:       Height:             Gen: NAD  Ext: 2+ radial pulse, no evidence of hematoma    Plan:  -Post cath care per protocol  -ASA for life, plavix at least a year  -Cardiac rehab  -Smoking cessation  -DM management

## 2018-06-06 NOTE — ASSESSMENT & PLAN NOTE
-blood glucose elevated at 535 upon admission with elevated CBGs overnight  -HgbA1c 9.3 this AM; family history of DM  -urine with 3+ glucose and no ketones  -will consult dietian for dietary education on diabetic and cardiac diet  -will consult with Hospital Medicine in regards to best treatment of new onset DM

## 2018-06-06 NOTE — ASSESSMENT & PLAN NOTE
-complaints of dizziness and weakness upon arrival  -HR currently 60s  -HR stable overnight; HR down to upper 40s with no recurrent HR in the 20s overnight  -continue to monitor on  telemetry   -currently feel HR etiology related to NSTEMI; will not order BB and continue to monitor HR on telemetry  -may need Holter monitor as an outpatient

## 2018-06-06 NOTE — PLAN OF CARE
TN went to meet with patient, no family at bedside.  Patient lives with her daughter and is independent at home.  She does not have home health or medical equipment at home.   Patient still drives. Her PCP is Dr. Aleman. TN will continue to follow and assess discharge needs.     06/06/18 1033   Discharge Assessment   Assessment Type Discharge Planning Assessment   Confirmed/corrected address and phone number on facesheet? Yes   Assessment information obtained from? Patient   Prior to hospitilization cognitive status: Alert/Oriented   Prior to hospitalization functional status: Independent   Current cognitive status: Alert/Oriented   Current Functional Status: Independent   Facility Arrived From: Home   Lives With child(gloria), adult   Able to Return to Prior Arrangements yes   Is patient able to care for self after discharge? Yes   Who are your caregiver(s) and their phone number(s)? Luz Guzman Daughter [phone]    Patient's perception of discharge disposition home or selfcare   Readmission Within The Last 30 Days no previous admission in last 30 days   Equipment Currently Used at Home none   Do you have any problems affording any of your prescribed medications? TBD   Transportation Available family or friend will provide   Dialysis Name and Scheduled days N/A   Does the patient receive services at the [facility]? No   Discharge Plan A Home with family   Discharge Plan B Home with family;Home Health   Patient/Family In Agreement With Plan yes     Angela Coyle RN  Transition Navigator  [phone]

## 2018-06-06 NOTE — PLAN OF CARE
Problem: Pain, Acute (Adult)  Goal: Identify Related Risk Factors and Signs and Symptoms  Related risk factors and signs and symptoms are identified upon initiation of Human Response Clinical Practice Guideline (CPG)  Outcome: Ongoing (interventions implemented as appropriate)  No pain or s/s of pain noted upon arrival to ICU. VSS, no acute distress noted.

## 2018-06-06 NOTE — PLAN OF CARE
Problem: Fall Risk (Adult)  Goal: Identify Related Risk Factors and Signs and Symptoms  Related risk factors and signs and symptoms are identified upon initiation of Human Response Clinical Practice Guideline (CPG)  Outcome: Ongoing (interventions implemented as appropriate)  Pt remains fall free during hospitalization. Will continue to assist pt as needed to help prevent any injuries related to falls.

## 2018-06-06 NOTE — ASSESSMENT & PLAN NOTE
-glucose 535 on admit BMP  -CBGs overnight 181-314  -HgbA1c elevated; will discuss with Hospital Medicine best treatment for new onset DM

## 2018-06-06 NOTE — PROGRESS NOTES
[facility]  Cardiology  Progress Note    Patient Name: Deanna Garcia  MRN: [id number]  Admission Date: 6/5/2018  Hospital Length of Stay: 1 days  Code Status: Full Code   Attending Physician: Baron Marie MD   Primary Care Physician: Naldo Geiger MD  Expected Discharge Date:   Principal Problem:Bradycardia    Subjective:     Hospital Course:   6/5/2018 Presented to the ER with complaints of weakness and chest pain. Initial EKG per EMS with HR 27 with appearance of SB. Given Atropine with improvement in HR. Brought to the ER with reports of recurrent HR down to 40s and currently 60s with stable BP during cardiology assessment. EKG with SR normal axis and no STTWC. CBC WNL. BMP WNL with the exception of glucose 535. Troponin .122 with BNP 19. Echocardiogram with normal LVEF with no WMA   6/6/2018 HR 58-70 overnight with occasional rates down to 40s. No recurrent HR in the 20s noted on telemetry overnight. HgbA1c 9.3. CBGs 188-314 overnight.Troponin trended with trend up to .780-1.787 and 8.607 this AM. EKG with NSR and no STTWC. Loaded with ASA and Plavix. Remained NPO for LHC today         Review of Systems   Constitution: Negative for chills, decreased appetite, diaphoresis, fever and weakness.   Cardiovascular: Negative for chest pain, claudication, cyanosis, dyspnea on exertion, irregular heartbeat, leg swelling, near-syncope, orthopnea, palpitations, paroxysmal nocturnal dyspnea and syncope.   Respiratory: Negative for cough, hemoptysis, shortness of breath and wheezing.    Gastrointestinal: Negative for bloating, abdominal pain, constipation, diarrhea, melena, nausea and vomiting.   Neurological: Negative for dizziness.     Objective:     Vital Signs (Most Recent):  Temp: 97.7 °F (36.5 °C) (06/06/18 0715)  Pulse: (!) 58 (06/06/18 0800)  Resp: (!) 26 (06/06/18 0800)  BP: (!) 114/58 (06/06/18 0800)  SpO2: (!) 94 % (06/06/18 0800) Vital Signs (24h Range):  Temp:  [97.7 °F (36.5 °C)-98.8  °F (37.1 °C)] 97.7 °F (36.5 °C)  Pulse:  [43-94] 58  Resp:  [15-44] 26  SpO2:  [90 %-99 %] 94 %  BP: ()/(48-67) 114/58     Weight: 56.9 kg (125 lb 7.1 oz)  Body mass index is 20.87 kg/m².     SpO2: (!) 94 %  O2 Device (Oxygen Therapy): (P) room air      Intake/Output Summary (Last 24 hours) at 06/06/18 1016  Last data filed at 06/06/18 0600   Gross per 24 hour   Intake          1526.67 ml   Output             1050 ml   Net           476.67 ml       Lines/Drains/Airways     Peripheral Intravenous Line                 Peripheral IV - Single Lumen 06/05/18 1301 Left Antecubital less than 1 day         Peripheral IV - Single Lumen 06/05/18 1305 Right Forearm less than 1 day                Physical Exam   Constitutional: She is oriented to person, place, and time. She appears well-developed and well-nourished. No distress.   Cardiovascular: Normal rate and regular rhythm.  Exam reveals no gallop.    No murmur heard.  Pulses:       Radial pulses are 2+ on the right side, and 2+ on the left side.   Pulmonary/Chest: Effort normal and breath sounds normal. No respiratory distress. She has no wheezes.   Abdominal: Soft. Bowel sounds are normal. She exhibits no distension. There is no tenderness.   Neurological: She is alert and oriented to person, place, and time.   Skin: Skin is warm and dry.       Significant Labs:       Recent Labs  Lab 06/06/18  0343      K 3.7      CO2 24   BUN 17   CREATININE 0.7       Recent Labs  Lab 06/06/18  0343   WBC 12.53   RBC 4.02   HGB 13.2   HCT 38.2      MCV 95   MCH 32.8*   MCHC 34.6       Recent Labs  Lab 06/06/18  0343   *   TROPONINI 8.607*       Significant Imaging: Echocardiogram:   2D echo with color flow doppler:   Results for orders placed or performed during the hospital encounter of 06/05/18   2D echo with color flow doppler   Result Value Ref Range    EF 65 55 - 65    Diastolic Dysfunction No     Est. PA Systolic Pressure 6.39     Mitral Valve  Mobility NORMAL     Tricuspid Valve Regurgitation TRIVIAL      Assessment and Plan:     Brief HPI: Seen this morning on AM NP rounds while resting in bed with daughter at the bedside. Denies any complaints of chest pain overnight. Reviewed POC with patient and daughter as detailed below-both verbalized understanding and agree with POC. Long discussion with patient in regards to chest pain with elevated troponin as well as elevate BS along with elevated HgbA1c. Educated on diagnosis of diabetes as well as a concern for CAD with recommendations for further evaluation with [facility]-both verbalized complete understanding     * Bradycardia    -complaints of dizziness and weakness upon arrival  -HR currently 60s  -HR stable overnight; HR down to upper 40s with no recurrent HR in the 20s overnight  -continue to monitor on  telemetry   -currently feel HR etiology related to NSTEMI; will not order BB and continue to monitor HR on telemetry  -may need Holter monitor as an outpatient         Type 2 diabetes mellitus    -blood glucose elevated at 535 upon admission with elevated CBGs overnight  -HgbA1c 9.3 this AM; family history of DM  -urine with 3+ glucose and no ketones  -will consult dietian for dietary education on diabetic and cardiac diet  -will consult with Hospital Medicine in regards to best treatment of new onset DM        NSTEMI (non-ST elevated myocardial infarction)    -presented with complaints of chest pain concerning for ischemic etiology  -initial troponin .122 with trend up to .780-1.787 overnight and 8.607 this AM  -EKG with no acute changes  -loaded with ASA and Plavix and placed on daily ASA and Plavix along with Lipitor  -no BB due to bradycardia upon admission; no ACEI/ARB due to marginal BP overnight  -NPO for [facility] today  -echo with normal LVEF; will hold off on Heparin and Lovenox for now due to plans for [facility] today         Hyperglycemia    -glucose 535 on admit BMP  -CBGs overnight 181-314  -HgbA1c  elevated; will discuss with Hospital Medicine best treatment for new onset DM         Tobacco abuse    -counseled on importance of complete smoking cessation-receptive to teaching  -will refer to smoking cessation clinic upon discharge           Chest pain, unspecified    -complaints of chest pain with associated symptoms concerning for ischemic etiology   -EKG with no acute changes  -troponin trending up   -NPO for [facility] today             VTE Risk Mitigation         Ordered     IP VTE LOW RISK PATIENT  Once      06/05/18 1637     Place INEZ hose  Until discontinued      06/05/18 1637     Place sequential compression device  Until discontinued      06/05/18 1637          REILLY Conroy, ANP  Cardiology  [facility] [facility]

## 2018-06-06 NOTE — INTERVAL H&P NOTE
The patient has been examined and the H&P has been reviewed:    I concur with the findings and no changes have occurred since H&P was written.    Anesthesia/Surgery risks, benefits and alternative options discussed and understood by patient/family.          Active Hospital Problems    Diagnosis  POA    *Bradycardia [R00.1]  Yes    Type 2 diabetes mellitus [E11.9]  Yes    Chest pain, unspecified [R07.9]  Yes    Tobacco abuse [Z72.0]  Yes    Hyperglycemia [R73.9]  Yes    NSTEMI (non-ST elevated myocardial infarction) [I21.4]  Yes      Resolved Hospital Problems    Diagnosis Date Resolved POA   No resolved problems to display.

## 2018-06-06 NOTE — PLAN OF CARE
Problem: Patient Care Overview  Goal: Plan of Care Review  Outcome: Ongoing (interventions implemented as appropriate)  POC reviewed. SR/SA tonight. EKG done at 1230 after 8 beat run of vtach on monitor. No complaints voiced. VS stable. NPO after midnight per orders. Fall Precautions/Bed alarm on for safety. Uses BSC with assist to void and back to bed. Labs to be done in am.

## 2018-06-07 VITALS
TEMPERATURE: 98 F | SYSTOLIC BLOOD PRESSURE: 136 MMHG | DIASTOLIC BLOOD PRESSURE: 67 MMHG | BODY MASS INDEX: 20.87 KG/M2 | HEIGHT: 65 IN | RESPIRATION RATE: 24 BRPM | OXYGEN SATURATION: 96 % | HEART RATE: 87 BPM | WEIGHT: 125.25 LBS

## 2018-06-07 DIAGNOSIS — R00.1 BRADYCARDIA: Primary | ICD-10-CM

## 2018-06-07 DIAGNOSIS — I21.4 NSTEMI (NON-ST ELEVATED MYOCARDIAL INFARCTION): ICD-10-CM

## 2018-06-07 LAB
ANION GAP SERPL CALC-SCNC: 7 MMOL/L
BASOPHILS # BLD AUTO: 0.01 K/UL
BASOPHILS NFR BLD: 0.1 %
BUN SERPL-MCNC: 8 MG/DL
CALCIUM SERPL-MCNC: 8.9 MG/DL
CHLORIDE SERPL-SCNC: 106 MMOL/L
CO2 SERPL-SCNC: 26 MMOL/L
CREAT SERPL-MCNC: 0.6 MG/DL
DIFFERENTIAL METHOD: ABNORMAL
EOSINOPHIL # BLD AUTO: 0.1 K/UL
EOSINOPHIL NFR BLD: 0.6 %
ERYTHROCYTE [DISTWIDTH] IN BLOOD BY AUTOMATED COUNT: 12.1 %
EST. GFR  (AFRICAN AMERICAN): >60 ML/MIN/1.73 M^2
EST. GFR  (NON AFRICAN AMERICAN): >60 ML/MIN/1.73 M^2
GLUCOSE SERPL-MCNC: 195 MG/DL
HCT VFR BLD AUTO: 41.1 %
HGB BLD-MCNC: 14.3 G/DL
LYMPHOCYTES # BLD AUTO: 1.9 K/UL
LYMPHOCYTES NFR BLD: 20.6 %
MCH RBC QN AUTO: 32.9 PG
MCHC RBC AUTO-ENTMCNC: 34.8 G/DL
MCV RBC AUTO: 95 FL
MONOCYTES # BLD AUTO: 0.6 K/UL
MONOCYTES NFR BLD: 6 %
NEUTROPHILS # BLD AUTO: 6.8 K/UL
NEUTROPHILS NFR BLD: 72.6 %
PLATELET # BLD AUTO: 223 K/UL
PMV BLD AUTO: 9.8 FL
POCT GLUCOSE: 173 MG/DL (ref 70–110)
POCT GLUCOSE: 186 MG/DL (ref 70–110)
POTASSIUM SERPL-SCNC: 3.5 MMOL/L
RBC # BLD AUTO: 4.35 M/UL
SODIUM SERPL-SCNC: 139 MMOL/L
WBC # BLD AUTO: 9.31 K/UL

## 2018-06-07 PROCEDURE — 85025 COMPLETE CBC W/AUTO DIFF WBC: CPT

## 2018-06-07 PROCEDURE — 93010 ELECTROCARDIOGRAM REPORT: CPT | Mod: ,,, | Performed by: INTERNAL MEDICINE

## 2018-06-07 PROCEDURE — 25000003 PHARM REV CODE 250: Performed by: NURSE PRACTITIONER

## 2018-06-07 PROCEDURE — 36415 COLL VENOUS BLD VENIPUNCTURE: CPT

## 2018-06-07 PROCEDURE — 94761 N-INVAS EAR/PLS OXIMETRY MLT: CPT

## 2018-06-07 PROCEDURE — 99239 HOSP IP/OBS DSCHRG MGMT >30: CPT | Mod: ,,, | Performed by: NURSE PRACTITIONER

## 2018-06-07 PROCEDURE — 93005 ELECTROCARDIOGRAM TRACING: CPT

## 2018-06-07 PROCEDURE — 80048 BASIC METABOLIC PNL TOTAL CA: CPT

## 2018-06-07 RX ORDER — ASPIRIN 81 MG/1
81 TABLET ORAL DAILY
Refills: 0 | COMMUNITY
Start: 2018-06-08 | End: 2020-03-05 | Stop reason: SDUPTHER

## 2018-06-07 RX ORDER — DEXTROSE 4 G
TABLET,CHEWABLE ORAL
Qty: 1 EACH | Refills: 0 | Status: SHIPPED | OUTPATIENT
Start: 2018-06-07

## 2018-06-07 RX ORDER — NITROGLYCERIN 0.4 MG/1
0.4 TABLET SUBLINGUAL EVERY 5 MIN PRN
Qty: 25 TABLET | Refills: 11 | Status: SHIPPED | OUTPATIENT
Start: 2018-06-07 | End: 2019-06-07

## 2018-06-07 RX ORDER — ATORVASTATIN CALCIUM 80 MG/1
80 TABLET, FILM COATED ORAL DAILY
Qty: 30 TABLET | Refills: 11 | Status: SHIPPED | OUTPATIENT
Start: 2018-06-08 | End: 2019-05-08

## 2018-06-07 RX ORDER — CLOPIDOGREL BISULFATE 75 MG/1
75 TABLET ORAL DAILY
Qty: 30 TABLET | Refills: 11 | Status: SHIPPED | OUTPATIENT
Start: 2018-06-08 | End: 2019-05-08

## 2018-06-07 RX ORDER — METFORMIN HYDROCHLORIDE 500 MG/1
TABLET ORAL
Qty: 60 TABLET | Refills: 1 | Status: SHIPPED | OUTPATIENT
Start: 2018-06-07 | End: 2018-06-13

## 2018-06-07 RX ADMIN — ATORVASTATIN CALCIUM 80 MG: 40 TABLET, FILM COATED ORAL at 08:06

## 2018-06-07 RX ADMIN — PANTOPRAZOLE SODIUM 40 MG: 40 TABLET, DELAYED RELEASE ORAL at 08:06

## 2018-06-07 RX ADMIN — ASPIRIN 81 MG: 81 TABLET, COATED ORAL at 08:06

## 2018-06-07 RX ADMIN — CLOPIDOGREL BISULFATE 75 MG: 75 TABLET ORAL at 08:06

## 2018-06-07 NOTE — NURSING
Pt educated on checking blood glucose. Pt was able to demonstrate the proper way to check her blood glucose.

## 2018-06-07 NOTE — NURSING
Discharge instructions given. Follow up appointments, medications, and activity restrictions gone over with patient. Monitor taken off patient and pt assisted with placing home clothing on. Awaiting Nutritionist. Nutritionist called and message left.

## 2018-06-07 NOTE — PLAN OF CARE
Problem: Patient Care Overview  Goal: Plan of Care Review  Outcome: Ongoing (interventions implemented as appropriate)  Pt ambulated in [place] today. Vitals remained within normal limits throughout the day. Pt has denied pain. No signs or symptoms of distress noted throughout the day. Pt discharged.

## 2018-06-07 NOTE — NURSING
Perihernial IV discontinued per doctors order, tip intact, pt tolerated without complaint. Medications delivered from in house pharmacy. Awaiting patient education from nutritionist and Pt's transportation home to complete discharge. Will continue to assess.

## 2018-06-07 NOTE — PLAN OF CARE
Problem: Patient Care Overview  Goal: Plan of Care Review  Outcome: Ongoing (interventions implemented as appropriate)  Mrs. Garcia, will stop smoking,she has committed to eating healthy and  will no longer drinks Coke's throughout the day . The dietician will be by in the morning to give tips about following a cardiac diet and limiting the sugar intake. One stent was placed in the RCA today successfully; vas band removed this afternoon, with no hematoma or swelling. She is anticipating discharge tomorrow.

## 2018-06-07 NOTE — DISCHARGE SUMMARY
[facility]  Cardiology  Discharge Summary      Patient Name: Deanna Garcia  MRN: [id number]  Admission Date: 6/5/2018  Hospital Length of Stay: 2 days  Discharge Date and Time: 6/7/2018  1:07 PM  Attending Physician: No att. providers found  Discharging Provider: REILLY Conroy, ANP  Primary Care Physician: Stu Aleman MD    HPI: 56yo female with no significant past medical history who presented to the ER with complaints of weakness and chest pain. Ms. Garcia complains of feeling weak and dizzy while at work. She attributed it to the heat and returned to her car. Once in her car, her dizziness worsened and she began vomiting and noting blurred vision. She drove home, rested and took a shower with improvement in her symptoms. Her daughter wanted to bring her to the ER but she refused since she was feeling better. She proceeded with the rest of her day and denies any recurrent symptoms. She awoke today in her [employer] and proceeded to work. She complained of left sided chest pain that radiated to her arm and was accompanied by SOB and mild diaphoresis. The pain lasted about 15 minutes and resolved spontaneously. Approximately 30 minutes later the pain recurred but was not more intense and had a similar course. She alerted her daughter who called EMS. Upon arrival of EMS, EKG reviewed HR in the 20s and was brought to [facility] for further evaluation        Procedure(s) (LRB):  Left heart cath (N/A)     Indwelling Lines/Drains at time of discharge:  Lines/Drains/Airways          No matching active lines, drains, or airways          Hospital Course     6/5/2018 Presented to the ER with complaints of weakness and chest pain. Initial EKG per EMS with HR 27 with appearance of SB. Given Atropine with improvement in HR. Brought to the ER with reports of recurrent HR down to 40s and currently 60s with stable BP during cardiology assessment. EKG with SR normal axis and no STTWC. CBC WNL. BMP  WNL with the exception of glucose 535. Troponin .122 with BNP 19. Echocardiogram with normal LVEF with no WMA   6/6/2018 HR 58-70 overnight with occasional rates down to 40s. No recurrent HR in the 20s noted on telemetry overnight. HgbA1c 9.3. CBGs 188-314 overnight.Troponin trended with trend up to .780-1.787 and 8.607 this AM. EKG with NSR and no STTWC. Loaded with ASA and Plavix. Remained NPO for LHC today   6/7/2018 Underwent LHC via right radial approach with following results:    Single vessel coronary artery disease.  Normal LVEF.  Normal regional wall motion.  Diastolic dysfunction.  Successful IVUS guided PCI with KATI to distal RCA for NSTEMI with excellent result.    Tolerated procedure well overnight and recovered in the ICU. Chest pain free overnight with stable HR and BP. Ambulated in hallway with no complaints. Right radial site with no active bleeding or hematoma noted. Deemed ready for discharge and sent home on ASA, statin and Plavix as well as SL NTG for prn use and Metformin 500mg po daily with plans to increase to BID. No BB was prescribed due to bradycardia upon admission and no ACEI/ARB due to marginal BP. Will arrange for 30 day event monitor for further evaluation of bradycardia and will be referred to cardiac rehab. Will follow up with Dr. Aleman due to newly diagnosed diabetes. Stressed importance of aggressive BS control along with complete smoking cessation-very receptive to teaching and will to participate in TLC. Will follow up with Dr. Marie in 2-3 weeks  Consults:   Consults         Status Ordering Provider     Inpatient consult to Registered Dietitian/Nutritionist  Once     Provider:  (Not yet assigned)    Completed ROBERT DIAZ     Inpatient consult to Registered Dietitian/Nutritionist  Once     Provider:  (Not yet assigned)    Acknowledged ROBERT DIAZ          Significant Diagnostic Studies: Cardiac Graphics: Echocardiogram:   2D echo with color flow doppler:   Results  for orders placed or performed during the hospital encounter of 06/05/18   2D echo with color flow doppler   Result Value Ref Range    EF 65 55 - 65    Diastolic Dysfunction No     Est. PA Systolic Pressure 6.39     Mitral Valve Mobility NORMAL     Tricuspid Valve Regurgitation TRIVIAL        Pending Diagnostic Studies:     Procedure Component Value Units Date/Time    EKG 12-lead [[id number]]     Order Status:  Sent Lab Status:  No result           Final Active Diagnoses:    Diagnosis Date Noted POA    PRINCIPAL PROBLEM:  Bradycardia [R00.1] 06/05/2018 Yes    Type 2 diabetes mellitus [E11.9] 06/06/2018 Yes    Chest pain, unspecified [R07.9] 06/05/2018 Yes    Tobacco abuse [Z72.0] 06/05/2018 Yes    Hyperglycemia [R73.9] 06/05/2018 Yes    NSTEMI (non-ST elevated myocardial infarction) [I21.4] 06/05/2018 Yes      Problems Resolved During this Admission:    Diagnosis Date Noted Date Resolved POA       Discharged Condition: good    Follow Up:  Follow-up Information     Baron Marie MD On 6/19/2018.    Specialties:  INTERVENTIONAL CARDIOLOGY, Cardiology  Why:  AT 1:40 PM  Contact information:  [address]  [address]  [place] [address]  [phone]             Stu Aleman MD On 6/8/2018.    Specialty:  Family Medicine  Why:  AT 09:45 AM, Primary Care Physician FAX#[phone]  Contact information:  [address] [facility] [address]  [place] [address]  [phone]                 Patient Instructions:     Ambulatory consult to Diabetic Education   Referral Priority: Routine Referral Type: Consultation   Referral Reason: Specialty Services Required    Requested Specialty: Diabetes    Number of Visits Requested: 1 Expiration Date: 06/07/19     Diet diabetic     Diet Cardiac     Activity as tolerated     Lifting restrictions   Order Comments: No heavy lifting over 10lbs for 3-5 days     No dressing needed       Medications:  Reconciled Home Medications:      Medication List      START taking these  medications    aspirin 81 MG EC tablet  Commonly known as:  ECOTRIN  Take 1 tablet (81 mg total) by mouth once daily.  Start taking on:  6/8/2018     atorvastatin 80 MG tablet  Commonly known as:  LIPITOR  Take 1 tablet (80 mg total) by mouth once daily.  Start taking on:  6/8/2018     clopidogrel 75 mg tablet  Commonly known as:  PLAVIX  Take 1 tablet (75 mg total) by mouth once daily.  Start taking on:  6/8/2018     metFORMIN 500 MG tablet  Commonly known as:  GLUCOPHAGE  Take 1 tablet (500mg) by mouth daily then increase to 1 tablet (500mg) by mouth two times daily if no abdominal pain or diarrhea after one week     nitroGLYCERIN 0.4 MG SL tablet  Commonly known as:  NITROSTAT  Place 1 tablet (0.4 mg total) under the tongue every 5 (five) minutes as needed for Chest pain, if not relief after 2nd dose, call 911.     TRUE METRIX GLUCOSE METER Misc  Generic drug:  blood-glucose meter  test as directed     TRUE METRIX GLUCOSE TEST STRIP Strp  Generic drug:  blood sugar diagnostic  1 strip by Misc.(Non-Drug; Combo Route) route 2 (two) times daily.        ASK your doctor about these medications    TRUEPLUS LANCETS 30 gauge Misc  Generic drug:  lancets  use 1 lancet 2 (two) times daily to check blood glucose            Time spent on the discharge of patient: 45 minutes    REILLY Conroy, ANP  Cardiology  [facility]

## 2018-06-07 NOTE — PLAN OF CARE
Problem: Patient Care Overview  Goal: Plan of Care Review  Outcome: Ongoing (interventions implemented as appropriate)  Pt. on room air.  Will cont to monitor  .po

## 2018-06-07 NOTE — PROGRESS NOTES
RD Follow up to provide diet education regarding cardiac diet.  Patient is NPO for procedure and out of room at this time.  Unable to provide education and will attempt at a later date.

## 2018-06-07 NOTE — NURSING
Ambulatory Diabetic Referral ordered. Diabetic diet handouts given and gone over with patient. Pt escorted to car with primary RN and daughter. No signs or symptoms of distress noted.

## 2018-06-08 ENCOUNTER — TELEPHONE (OUTPATIENT)
Dept: FAMILY MEDICINE | Facility: CLINIC | Age: 58
End: 2018-06-08

## 2018-06-08 NOTE — TELEPHONE ENCOUNTER
----- Message from Anya Quinones sent at 6/8/2018  9:51 AM CDT -----  Contact: Daughter Luz - [phone]  Daughter Luz Mora [phone]     She will be a new patient, her daughter is just a little concerned because her mom sugar levels are high. she was just released from the Hospital and was told she needed to have a primary Dr.; Daughter just wants to speak with a nurse about her moms levels. Please advise

## 2018-06-11 LAB
POC ACTIVATED CLOTTING TIME K: 219 SEC (ref 74–137)
POC ACTIVATED CLOTTING TIME K: 230 SEC (ref 74–137)
SAMPLE: ABNORMAL
SAMPLE: ABNORMAL

## 2018-06-13 ENCOUNTER — OFFICE VISIT (OUTPATIENT)
Dept: FAMILY MEDICINE | Facility: CLINIC | Age: 58
End: 2018-06-13
Payer: COMMERCIAL

## 2018-06-13 ENCOUNTER — CLINICAL SUPPORT (OUTPATIENT)
Dept: FAMILY MEDICINE | Facility: CLINIC | Age: 58
End: 2018-06-13
Attending: FAMILY MEDICINE
Payer: COMMERCIAL

## 2018-06-13 ENCOUNTER — LAB VISIT (OUTPATIENT)
Dept: LAB | Facility: HOSPITAL | Age: 58
End: 2018-06-13
Attending: FAMILY MEDICINE
Payer: COMMERCIAL

## 2018-06-13 ENCOUNTER — CLINICAL SUPPORT (OUTPATIENT)
Dept: ELECTROPHYSIOLOGY | Facility: CLINIC | Age: 58
End: 2018-06-13
Payer: COMMERCIAL

## 2018-06-13 VITALS
WEIGHT: 122.13 LBS | BODY MASS INDEX: 20.35 KG/M2 | TEMPERATURE: 98 F | OXYGEN SATURATION: 96 % | HEART RATE: 77 BPM | HEIGHT: 65 IN | DIASTOLIC BLOOD PRESSURE: 62 MMHG | SYSTOLIC BLOOD PRESSURE: 108 MMHG

## 2018-06-13 DIAGNOSIS — F17.200 TOBACCO DEPENDENCE: ICD-10-CM

## 2018-06-13 DIAGNOSIS — I21.4 NSTEMI (NON-ST ELEVATED MYOCARDIAL INFARCTION): ICD-10-CM

## 2018-06-13 DIAGNOSIS — Z09 HOSPITAL DISCHARGE FOLLOW-UP: ICD-10-CM

## 2018-06-13 DIAGNOSIS — Z01.419 WELL WOMAN EXAM: Primary | ICD-10-CM

## 2018-06-13 DIAGNOSIS — Z12.39 BREAST CANCER SCREENING: ICD-10-CM

## 2018-06-13 DIAGNOSIS — I51.89 DIASTOLIC DYSFUNCTION: ICD-10-CM

## 2018-06-13 DIAGNOSIS — E11.65 TYPE 2 DIABETES MELLITUS WITH HYPERGLYCEMIA, WITHOUT LONG-TERM CURRENT USE OF INSULIN: ICD-10-CM

## 2018-06-13 DIAGNOSIS — E78.5 HYPERLIPIDEMIA, UNSPECIFIED HYPERLIPIDEMIA TYPE: ICD-10-CM

## 2018-06-13 DIAGNOSIS — R00.1 BRADYCARDIA: ICD-10-CM

## 2018-06-13 DIAGNOSIS — I25.10 CORONARY ARTERY DISEASE INVOLVING NATIVE CORONARY ARTERY OF NATIVE HEART WITHOUT ANGINA PECTORIS: ICD-10-CM

## 2018-06-13 DIAGNOSIS — Z01.419 WELL WOMAN EXAM: ICD-10-CM

## 2018-06-13 DIAGNOSIS — Z12.11 COLON CANCER SCREENING: ICD-10-CM

## 2018-06-13 DIAGNOSIS — Z11.59 NEED FOR HEPATITIS C SCREENING TEST: ICD-10-CM

## 2018-06-13 DIAGNOSIS — R79.89 ELEVATED LIVER FUNCTION TESTS: ICD-10-CM

## 2018-06-13 LAB
25(OH)D3+25(OH)D2 SERPL-MCNC: 31 NG/ML
ALBUMIN SERPL BCP-MCNC: 3.8 G/DL
ALP SERPL-CCNC: 130 U/L
ALT SERPL W/O P-5'-P-CCNC: 20 U/L
ANION GAP SERPL CALC-SCNC: 7 MMOL/L
AST SERPL-CCNC: 19 U/L
BILIRUB SERPL-MCNC: 0.5 MG/DL
BUN SERPL-MCNC: 23 MG/DL
CALCIUM SERPL-MCNC: 9.7 MG/DL
CHLORIDE SERPL-SCNC: 101 MMOL/L
CO2 SERPL-SCNC: 30 MMOL/L
CREAT SERPL-MCNC: 0.7 MG/DL
EST. GFR  (AFRICAN AMERICAN): >60 ML/MIN/1.73 M^2
EST. GFR  (NON AFRICAN AMERICAN): >60 ML/MIN/1.73 M^2
GLUCOSE SERPL-MCNC: 147 MG/DL
POTASSIUM SERPL-SCNC: 3.9 MMOL/L
PROT SERPL-MCNC: 7 G/DL
SODIUM SERPL-SCNC: 138 MMOL/L
TSH SERPL DL<=0.005 MIU/L-ACNC: 1.69 UIU/ML

## 2018-06-13 PROCEDURE — 3008F BODY MASS INDEX DOCD: CPT | Mod: CPTII,S$GLB,, | Performed by: FAMILY MEDICINE

## 2018-06-13 PROCEDURE — 99386 PREV VISIT NEW AGE 40-64: CPT | Mod: 25,S$GLB,, | Performed by: FAMILY MEDICINE

## 2018-06-13 PROCEDURE — 82306 VITAMIN D 25 HYDROXY: CPT

## 2018-06-13 PROCEDURE — 3046F HEMOGLOBIN A1C LEVEL >9.0%: CPT | Mod: CPTII,S$GLB,, | Performed by: FAMILY MEDICINE

## 2018-06-13 PROCEDURE — 36415 COLL VENOUS BLD VENIPUNCTURE: CPT | Mod: PO

## 2018-06-13 PROCEDURE — 99999 PR PBB SHADOW E&M-EST. PATIENT-LVL V: CPT | Mod: PBBFAC,,, | Performed by: FAMILY MEDICINE

## 2018-06-13 PROCEDURE — 84443 ASSAY THYROID STIM HORMONE: CPT

## 2018-06-13 PROCEDURE — 93268 ECG RECORD/REVIEW: CPT | Mod: S$GLB,,, | Performed by: INTERNAL MEDICINE

## 2018-06-13 PROCEDURE — 80053 COMPREHEN METABOLIC PANEL: CPT

## 2018-06-13 PROCEDURE — 86803 HEPATITIS C AB TEST: CPT

## 2018-06-13 PROCEDURE — 99212 OFFICE O/P EST SF 10 MIN: CPT | Mod: S$GLB,,, | Performed by: FAMILY MEDICINE

## 2018-06-13 PROCEDURE — 99999 PR PBB SHADOW E&M-EST. PATIENT-LVL II: CPT | Mod: PBBFAC,,,

## 2018-06-13 RX ORDER — METFORMIN HYDROCHLORIDE 500 MG/1
1000 TABLET, EXTENDED RELEASE ORAL 2 TIMES DAILY WITH MEALS
Qty: 360 TABLET | Refills: 3 | Status: SHIPPED | OUTPATIENT
Start: 2018-06-13 | End: 2018-09-14 | Stop reason: SDUPTHER

## 2018-06-13 NOTE — PROGRESS NOTES
Deanna Garcia is a 57 y.o. female here for a diabetic eye screening with non-dilated fundus photos per Dr. Garcia.    Patient cooperative?: Yes  Small pupils?: No  Last eye exam: No previous exam.    For exam results, see Encounter Report.

## 2018-06-13 NOTE — PROGRESS NOTES
Subjective:       Patient ID: Deanna Garcia is a 57 y.o. female.    Chief Complaint: Annual Exam and Establish Care    Deanna Garcia is a 57 y.o. female who presents today to establish care.     Diet: she usually eats home cooked meals. She eats fried food rarely. She likes sweets; she eats them frequently. She quit drinking soda last week. She doesn't drink tea or coffee. She drinks juice (crystal light) multiple times per day.   Exercise:  She doesn't exercise outside of work  Labs: ordered    She was recently discharged from the hospital. She had a MI and a stent placed while inpatient. Per inpatient note, Single vessel coronary artery disease. With normal LVEF.  Normal regional wall motion. Diastolic dysfunction. Successful IVUS guided PCI with KATI to distal RCA for NSTEMI with excellent result. Her diabetes is also newly diagnosed while in the hospital. She is tolerating her statin well.     C-scope: ordered  Mammogram: ordered  Pap: s/p hysterectomy    PMHx: reviewed in EMR and updated  Meds: reviewed in EMR and updated  Shx: reviewed in EMR and updated  FMHx: no family history of colon cancer, breast cancer, ovarian cancer  Social: she works for a [employer]. She lives with her daughter. There are no pets at home. Her daughter is Luz and she is in the room today.       Hyperlipidemia   This is a new problem. This is a new diagnosis. The problem is uncontrolled. Exacerbating diseases include diabetes and obesity. Factors aggravating her hyperlipidemia include smoking. Associated symptoms include chest pain. Pertinent negatives include no focal sensory loss, focal weakness, leg pain, myalgias or shortness of breath. Current antihyperlipidemic treatment includes statins. Compliance problems include psychosocial issues.  Risk factors for coronary artery disease include diabetes mellitus and dyslipidemia.   Diabetes   She presents for her initial diabetic visit. She has type 1 diabetes  mellitus. There are no hypoglycemic associated symptoms. Associated symptoms include chest pain. Pertinent negatives for diabetes include no blurred vision, no fatigue, no foot paresthesias, no foot ulcerations, no polydipsia, no polyphagia, no polyuria and no visual change. There are no hypoglycemic complications. Symptoms are stable. Diabetic complications include heart disease. Risk factors for coronary artery disease include diabetes mellitus, dyslipidemia and tobacco exposure. Current diabetic treatment includes oral agent (monotherapy). She is compliant with treatment some of the time. She is following a generally unhealthy diet. She has not had a previous visit with a dietitian. She rarely participates in exercise. An ACE inhibitor/angiotensin II receptor blocker is not being taken.     Review of Systems   Constitutional: Negative for fatigue.   Eyes: Negative for blurred vision.   Respiratory: Negative for shortness of breath.    Cardiovascular: Positive for chest pain.   Endocrine: Negative for polydipsia, polyphagia and polyuria.   Musculoskeletal: Negative for myalgias.   Neurological: Negative for focal weakness.         Health Maintenance Due   Topic Date Due    Hepatitis C Screening  1960    Eye Exam  08/26/1970    TETANUS VACCINE  08/26/1978    Pneumococcal PPSV23 (Medium Risk) (1) 08/26/1978    Colonoscopy  08/26/2010    Urine Microalbumin  11/05/2015    Mammogram  11/13/2016       Objective:     Vitals:    06/13/18 1059   BP: 108/62   Pulse: 77   Temp: 98.3 °F (36.8 °C)        Physical Exam   Constitutional: She is oriented to person, place, and time. She appears well-developed and well-nourished.   HENT:   Head: Normocephalic and atraumatic.   Right Ear: Tympanic membrane, external ear and ear canal normal.   Left Ear: Tympanic membrane, external ear and ear canal normal.   Nose: Nose normal.   Mouth/Throat: Oropharynx is clear and moist and mucous membranes are normal.   Eyes:  Conjunctivae are normal. Pupils are equal, round, and reactive to light.   Neck: Normal range of motion. Neck supple.   Cardiovascular: Normal rate, regular rhythm and normal heart sounds.    Pulmonary/Chest: Effort normal and breath sounds normal. No respiratory distress.   Abdominal: Soft. Bowel sounds are normal. She exhibits no distension.   Genitourinary:   Genitourinary Comments: deferred    Musculoskeletal: She exhibits no edema.   Feet:   Right Foot:   Protective Sensation: 10 sites tested. 10 sites sensed.   Skin Integrity: Negative for ulcer, blister, skin breakdown, erythema, warmth, callus or dry skin.   Left Foot:   Protective Sensation: 10 sites tested. 10 sites sensed.   Skin Integrity: Negative for ulcer, blister, skin breakdown, erythema, warmth, callus or dry skin.   Neurological: She is alert and oriented to person, place, and time. No cranial nerve deficit or sensory deficit. She exhibits normal muscle tone.   Skin: Skin is warm and dry.   Psychiatric: She has a normal mood and affect. Her speech is normal and behavior is normal. Judgment and thought content normal.   Nursing note and vitals reviewed.      Assessment:       1. Well woman exam    2. Hospital discharge follow-up    3. Diastolic dysfunction    4. Coronary artery disease involving native coronary artery of native heart without angina pectoris    5. NSTEMI (non-ST elevated myocardial infarction)    6. Elevated liver function tests    7. Type 2 diabetes mellitus with hyperglycemia, without long-term current use of insulin    8. Tobacco dependence    9. Breast cancer screening    10. Colon cancer screening    11. Need for hepatitis C screening test    12. Hyperlipidemia, unspecified hyperlipidemia type        Plan:         Well woman exam  ?Avoid tobacco  ?Be physically active  ?Maintain a healthy weight  ?Eat a diet rich in fruits, vegetables, and whole grains, and low in saturated/trans fat  ?Limit alcohol consumption  ?Protect  against sexually transmitted infections  ?Avoid excess sun  -     Comprehensive metabolic panel; Future; Expected date: 06/13/2018  -     TSH; Future; Expected date: 06/13/2018  -     Vitamin D; Future; Expected date: 06/13/2018  -     Hepatitis C antibody; Future; Expected date: 06/13/2018    Hospital discharge follow-up  Reviewed discharge summary and ED note    Diastolic dysfunction  Noted    Coronary artery disease involving native coronary artery of native heart without angina pectoris/NSTEMI (non-ST elevated myocardial infarction)  Successful IVUS guided PCI with KATI to distal RCA for NSTEMI   Continue statin  Continue aspirin and plavix  Keep cardiology appointment follow up  -     Lipid panel; Future; Expected date: 06/13/2018    Elevated liver function tests  Repeat CMP    Type 2 diabetes mellitus with hyperglycemia, without long-term current use of insulin  Metformin 1000 mg BID  Diabetic education  Eye screening  Urine microalbumin  Follow up in 1 month  Follow up in 3 months  Keep blood sugar log  Complete 30 day event monitor for further evaluation of bradycardia and complete cardiac rehab  -     Microalbumin/creatinine urine ratio; Standing  -     Ambulatory Referral to Diabetes Education  -     Diabetic Eye Screening Photo; Future  -     metFORMIN (GLUCOPHAGE-XR) 500 MG 24 hr tablet; Take 2 tablets (1,000 mg total) by mouth 2 (two) times daily with meals.  Dispense: 360 tablet; Refill: 3  -     Hemoglobin A1c; Future; Expected date: 06/13/2018    Tobacco dependence  -     Ambulatory referral to Smoking Cessation Program    Breast cancer screening  -     Mammo Digital Screening Bilat with Tomosynthesis with CAD; Future; Expected date: 06/13/2018    Colon cancer screening  -     Case request GI: COLONOSCOPY    Need for hepatitis C screening test  -     Hepatitis C antibody; Future; Expected date: 06/13/2018    Hyperlipidemia, unspecified hyperlipidemia type  -     Lipid panel; Future; Expected date:  06/13/2018    Warning signs discussed, patient to call with any further issues or worsening of symptoms.

## 2018-06-13 NOTE — PATIENT INSTRUCTIONS
Diabetes Management Status    Statin: Taking  ACE/ARB: Not taking    Screening or Prevention Patient's value Goal Complete/Controlled?   HgA1C Testing and Control   Lab Results   Component Value Date    HGBA1C 9.3 (H) 06/05/2018      Annually/Less than 8% No   Lipid profile : 06/06/2018 Annually Yes   LDL control Lab Results   Component Value Date    LDLCALC 125.4 06/06/2018    Annually/Less than 100 mg/dl  No   Nephropathy screening No results found for: LABMICR  Lab Results   Component Value Date    PROTEINUA Negative 06/05/2018    Annually Yes   Blood pressure BP Readings from Last 1 Encounters:   06/13/18 108/62    Less than 140/90 Yes   Dilated retinal exam Most Recent Eye Exam Date: Not Found Annually Yes   Foot exam   : 06/13/2018 Annually No

## 2018-06-14 ENCOUNTER — PATIENT MESSAGE (OUTPATIENT)
Dept: FAMILY MEDICINE | Facility: CLINIC | Age: 58
End: 2018-06-14

## 2018-06-14 LAB — HCV AB SERPL QL IA: NEGATIVE

## 2018-06-15 ENCOUNTER — TELEPHONE (OUTPATIENT)
Dept: CARDIAC REHAB | Facility: CLINIC | Age: 58
End: 2018-06-15

## 2018-06-18 LAB
CORONARY STENOSIS: ABNORMAL
CORONARY STENT: YES

## 2018-06-19 ENCOUNTER — TELEPHONE (OUTPATIENT)
Dept: FAMILY MEDICINE | Facility: CLINIC | Age: 58
End: 2018-06-19

## 2018-06-19 ENCOUNTER — CLINICAL SUPPORT (OUTPATIENT)
Dept: DIABETES | Facility: CLINIC | Age: 58
End: 2018-06-19
Payer: COMMERCIAL

## 2018-06-19 ENCOUNTER — OFFICE VISIT (OUTPATIENT)
Dept: CARDIOLOGY | Facility: CLINIC | Age: 58
End: 2018-06-19
Payer: COMMERCIAL

## 2018-06-19 VITALS
DIASTOLIC BLOOD PRESSURE: 68 MMHG | BODY MASS INDEX: 20.16 KG/M2 | HEART RATE: 76 BPM | SYSTOLIC BLOOD PRESSURE: 106 MMHG | OXYGEN SATURATION: 98 % | WEIGHT: 121 LBS | HEIGHT: 65 IN

## 2018-06-19 VITALS — BODY MASS INDEX: 20.14 KG/M2 | WEIGHT: 121 LBS

## 2018-06-19 DIAGNOSIS — E78.5 HYPERLIPIDEMIA, UNSPECIFIED HYPERLIPIDEMIA TYPE: ICD-10-CM

## 2018-06-19 DIAGNOSIS — Z72.0 TOBACCO ABUSE: ICD-10-CM

## 2018-06-19 DIAGNOSIS — R73.9 HYPERGLYCEMIA: ICD-10-CM

## 2018-06-19 DIAGNOSIS — E11.65 TYPE 2 DIABETES MELLITUS WITH HYPERGLYCEMIA, WITHOUT LONG-TERM CURRENT USE OF INSULIN: ICD-10-CM

## 2018-06-19 DIAGNOSIS — R00.1 BRADYCARDIA: ICD-10-CM

## 2018-06-19 DIAGNOSIS — H35.369 DRUSEN: Primary | ICD-10-CM

## 2018-06-19 DIAGNOSIS — I51.89 DIASTOLIC DYSFUNCTION: ICD-10-CM

## 2018-06-19 DIAGNOSIS — I21.4 NSTEMI (NON-ST ELEVATED MYOCARDIAL INFARCTION): ICD-10-CM

## 2018-06-19 DIAGNOSIS — I25.10 CORONARY ARTERY DISEASE INVOLVING NATIVE CORONARY ARTERY OF NATIVE HEART WITHOUT ANGINA PECTORIS: Primary | ICD-10-CM

## 2018-06-19 DIAGNOSIS — Z95.5 HISTORY OF CORONARY ARTERY STENT PLACEMENT: ICD-10-CM

## 2018-06-19 PROCEDURE — 3008F BODY MASS INDEX DOCD: CPT | Mod: CPTII,S$GLB,, | Performed by: INTERNAL MEDICINE

## 2018-06-19 PROCEDURE — 99214 OFFICE O/P EST MOD 30 MIN: CPT | Mod: S$GLB,,, | Performed by: INTERNAL MEDICINE

## 2018-06-19 PROCEDURE — G0108 DIAB MANAGE TRN  PER INDIV: HCPCS | Mod: S$GLB,,, | Performed by: INTERNAL MEDICINE

## 2018-06-19 PROCEDURE — 92250 FUNDUS PHOTOGRAPHY W/I&R: CPT | Mod: S$GLB,,, | Performed by: OPTOMETRIST

## 2018-06-19 PROCEDURE — 3046F HEMOGLOBIN A1C LEVEL >9.0%: CPT | Mod: CPTII,S$GLB,, | Performed by: INTERNAL MEDICINE

## 2018-06-19 PROCEDURE — 99999 PR PBB SHADOW E&M-EST. PATIENT-LVL III: CPT | Mod: PBBFAC,,, | Performed by: INTERNAL MEDICINE

## 2018-06-19 PROCEDURE — 99999 PR PBB SHADOW E&M-EST. PATIENT-LVL I: CPT | Mod: PBBFAC,,,

## 2018-06-19 NOTE — TELEPHONE ENCOUNTER
----- Message from Anya Quinones sent at 6/19/2018  4:49 PM CDT -----  Contact: self / [phone]  Patient is returning a call from your office. Please advise

## 2018-06-19 NOTE — TELEPHONE ENCOUNTER
Please call the patient and let her know that it doesn't appear that the diabetes has affected her eyes. However, the screening test showed some signs of Drusen which are yellow deposits under the retina and can lead to problems in the future. The recommendation is to see an eye specialist in the next three months.     Crystal, she needs to see a Retina Specialist.

## 2018-06-19 NOTE — PROGRESS NOTES
Diabetes Education  Author: Hay Logan RN  Date: 6/19/2018    Diabetes Education Visit  Diabetes Education Record Assessment/Progress: Initial  Diabetes Type  Diabetes Type : Type II  Diabetes History  Diabetes Diagnosis: 0-1 year  Nutrition  Meal Planning: water, 3 meals per day, diet drinks, eats out seldom, snacks between meal  What type of sweetener do you use?: Splenda  What type of beverages do you drink?: diet soda/tea, water  Meal Plan 24 Hour Recall - Breakfast: coffee, toast, cheese  Meal Plan 24 Hour Recall - Lunch: salmon, 1/2 avocado, sun bread, hummus  Meal Plan 24 Hour Recall - Dinner: rice, beans, salmon  Meal Plan 24 Hour Recall - Snack: cucumbers, hummus, apple, chips  Monitoring   Self Monitoring : pt has a meter and is checking 2 times a day - fasting in am and at bedtime. instructed pt on the normal bs ranges. instructed pt to keep a record of her bs's and to bring the record to her md visits.  Blood Glucose Logs: Yes  Do you use a personal glucose monitor?: No  In the last month, how often have you had a low blood sugar reaction?: never  Can you tell when your blood sugar is too high?: no  Exercise   Exercise Type: none  Current Diabetes Treatment   Current Treatment: Oral Medication, Diet, Exercise  Social History  Preferred Learning Method: Face to Face, Group Education, Demonstration, Reading Materials  Primary Support: Self  Educational Level: High School  Occupation: self employed  Smoking Status: Ex Smoker  Alcohol Use: Never  DDS-2 Score  ( > 3 = SIGNIFICANT DISTRESS): 2   Barriers to Change  Barriers to Change: None  Learning Challenges : None  Readiness to Learn   Readiness to Learn : Acceptance  Cultural Influences  Cultural Influences: None  Diabetes Education Assessment/Progress  Diabetes Disease Process (diabetes disease process and treatment options): Discussion, Instructed, Individual Session, Demonstrates Understanding/Competency(verbalizes/demonstrates), Written  Materials Provided  Nutrition (Incorporating nutritional management into one's lifestyle): Discussion, Demonstration, Instructed, Individual Session, Demonstrates Understanding/Competency (verbalizes/demonstrates), Written Materials Provided  Physical Activity (incorporating physical activity into one's lifestyle): Discussion, Instructed, Individual Session, Demonstrates Understanding/Competency (verbalizes/demonstrates), Written Materials Provided  Medications (states correct name, dose, onset, peak, duration, side effects & timing of meds): Discussion, Instructed, Individual Session, Demonstrates Understanding/Competency(verbalizes/demonstrates), Written Materials Provided  Monitoring (monitoring blood glucose/other parameters & using results): Discussion, Instructed, Individual Session, Demonstrates Understanding/Competency (verbalizes/demonstrates), Written Materials Provided  Acute Complications (preventing, detecting, and treating acute complications): Discussion, Instructed, Individual Session, Demonstrates Understanding/Competency (verbalizes/demonstrates), Written Materials Provided  Chronic Complications (preventing, detecting, and treating chronic complications): Discussion, Instructed, Individual Session, Demonstrates Understanding/Competency (verbalizes/demonstrates), Written Materials Provided  Clinical (diabetes, other pertinent medical history, and relevant comorbidities reviewed during visit): Discussion, Instructed, Individual Session, Demonstrates Understanding/Competency (verbalizes/demonstrates)  Cognitive (knowledge of self-management skills, functional health literacy): Discussion, Instructed, Individual Session, Demonstrates Understanding/Competency (verbalizes/demonstrates)  Psychosocial (emotional response to diabetes): Discussion, Instructed, Individual Session, Demonstrates Understanding/Competency (verbalizes/demonstrates)  Diabetes Distress and Support Systems: Discussion, Instructed,  Individual Session, Demonstrates Understanding/Competency (verbalizes/demonstrates)  Behavioral (readiness for change, lifestyle practices, self-care behaviors): Discussion, Instructed, Individual Session, Demonstrates Understanding/Competency (verbalizes/demonstrates)  Goals  Patient has selected/evaluated goals during today's session: Yes, selected  Healthy Eating: Set  Start Date: 06/19/18  Target Date: 09/19/18  Diabetes Care Plan/Intervention  Education Plan/Intervention: Group Follow-Up DSMT, Eye Exam, Foot Exam  Diabetes Meal Plan  Restrictions: Restricted Carbohydrate  Calories: 1800  Carbohydrate Per Meal: 30-45g  Carbohydrate Per Snack : 15-20g  Instructed pt on the food groups, how to read labels and count carbs. Discussed with pt the importance of eating balanced and portioned meals 3 times a day. Discussed with pt foods that she liked that could be included in her meal plan. Pt was given sample menus and meal plans as examples. Pt had good understanding of meal plan and compliance is expected.  Education Units of Time   Time Spent: 90 min    Health Maintenance was reviewed today with patient. Discussed with patient importance of routine eye exams, foot exams/foot care, blood work (i.e.: A1c, microalbumin, and lipid), dental visits, yearly flu vaccine, and pneumonia vaccine as indicated by PCP. Patient verbalized understanding.     Health Maintenance Topics with due status: Not Due       Topic Last Completion Date    Hemoglobin A1c 06/05/2018    Lipid Panel 06/06/2018    High Dose Statin 06/13/2018    Foot Exam 06/13/2018    Urine Microalbumin 06/13/2018    Influenza Vaccine Not Due     Health Maintenance Due   Topic Date Due    Eye Exam  08/26/1970    TETANUS VACCINE  08/26/1978    Pneumococcal PPSV23 (Medium Risk) (1) 08/26/1978    Colonoscopy  08/26/2010    Mammogram  11/13/2016

## 2018-06-19 NOTE — TELEPHONE ENCOUNTER
I spoke with patient daughter and informed her that patient have  some signs of Drusen which are yellow deposits under the retina and can lead to problems in the future. The recommendation in the next three months. Patient daughter would like to scheduled sooner to see specialist. I explained to patient daughter that Crystal will give her a call to scheduled appointment. Patient voiced understanding.

## 2018-06-19 NOTE — PROGRESS NOTES
Subjective:    Patient ID:  Deanna Garcia is a 57 y.o. female who presents for follow-up of Coronary Artery Disease      HPI  58 y/o female with hx of CAD s/p recent NSTEMI with successful PCI to distal RCA with KATI, HLD, DM, diastolic dysfunction, tobacco abuse (recently quit) who presents for hospital f/u. Originally seen for bradycardia and CP, diagnosed with NSTEMI, had PCI to distal RCA, started on good meds (BB held due to bradycardia) and discharged home with event monitor. Has done well since discharge and denies CP, SOB/MACEDO, orthopnea, PND, palps, syncope, LE edema. Compliant with and tolerating meds. Has stopped smoking and feels good.     Review of Systems   Constitution: Negative for weakness and malaise/fatigue.   HENT: Negative for congestion.    Eyes: Negative for blurred vision.   Cardiovascular: Negative for chest pain, claudication, cyanosis, dyspnea on exertion, irregular heartbeat, leg swelling, near-syncope, orthopnea, palpitations, paroxysmal nocturnal dyspnea and syncope.   Respiratory: Negative for shortness of breath.    Endocrine: Negative for polyuria.   Hematologic/Lymphatic: Negative for bleeding problem.   Skin: Negative for itching and rash.   Musculoskeletal: Negative for joint swelling, muscle cramps and muscle weakness.   Gastrointestinal: Negative for abdominal pain, hematemesis, hematochezia, melena, nausea and vomiting.   Genitourinary: Negative for dysuria and hematuria.   Neurological: Negative for dizziness, focal weakness, headaches, light-headedness and loss of balance.   Psychiatric/Behavioral: Negative for depression. The patient is not nervous/anxious.         Objective:    Physical Exam   Constitutional: She is oriented to person, place, and time. She appears well-developed and well-nourished.   HENT:   Head: Normocephalic and atraumatic.   Neck: Neck supple. No JVD present.   Cardiovascular: Normal rate, regular rhythm and normal heart sounds.    Pulses:        Carotid pulses are 2+ on the right side, and 2+ on the left side.       Radial pulses are 2+ on the right side, and 2+ on the left side.        Femoral pulses are 2+ on the right side, and 2+ on the left side.       Dorsalis pedis pulses are 2+ on the right side, and 2+ on the left side.        Posterior tibial pulses are 2+ on the right side, and 2+ on the left side.   Pulmonary/Chest: Effort normal and breath sounds normal.   Abdominal: Soft. Bowel sounds are normal.   Musculoskeletal: She exhibits no edema.   Neurological: She is alert and oriented to person, place, and time.   Skin: Skin is warm and dry.   Psychiatric: She has a normal mood and affect. Her behavior is normal. Thought content normal.         Assessment:       1. Coronary artery disease involving native coronary artery of native heart without angina pectoris    2. NSTEMI (non-ST elevated myocardial infarction)    3. History of coronary artery stent placement    4. Diastolic dysfunction    5. Bradycardia    6. Hyperlipidemia, unspecified hyperlipidemia type    7. Type 2 diabetes mellitus with hyperglycemia, without long-term current use of insulin    8. Hyperglycemia    9. Tobacco abuse      58 y/o female with hx and presentation as above. Doing well and tolerating meds well. Discussed cardiac rehab, however, she will not be able to attend sessions. DAPT x at least 1 year.        Plan:       -Continue current medical management  -f/u in 1 year

## 2018-06-20 ENCOUNTER — TELEPHONE (OUTPATIENT)
Dept: CARDIAC REHAB | Facility: CLINIC | Age: 58
End: 2018-06-20

## 2018-06-21 ENCOUNTER — HOSPITAL ENCOUNTER (OUTPATIENT)
Dept: RADIOLOGY | Facility: HOSPITAL | Age: 58
Discharge: HOME | End: 2018-06-21
Attending: FAMILY MEDICINE
Payer: COMMERCIAL

## 2018-06-21 ENCOUNTER — OFFICE VISIT (OUTPATIENT)
Dept: FAMILY MEDICINE | Facility: CLINIC | Age: 58
End: 2018-06-21
Payer: COMMERCIAL

## 2018-06-21 VITALS
HEART RATE: 78 BPM | OXYGEN SATURATION: 97 % | DIASTOLIC BLOOD PRESSURE: 60 MMHG | WEIGHT: 121.5 LBS | TEMPERATURE: 98 F | BODY MASS INDEX: 20.24 KG/M2 | HEIGHT: 65 IN | SYSTOLIC BLOOD PRESSURE: 102 MMHG

## 2018-06-21 DIAGNOSIS — Z71.9 VISIT FOR COUNSELING: Primary | ICD-10-CM

## 2018-06-21 DIAGNOSIS — Z12.39 BREAST CANCER SCREENING: ICD-10-CM

## 2018-06-21 PROCEDURE — 77067 SCR MAMMO BI INCL CAD: CPT | Mod: 26,,, | Performed by: RADIOLOGY

## 2018-06-21 PROCEDURE — 77067 SCR MAMMO BI INCL CAD: CPT | Mod: TC

## 2018-06-21 PROCEDURE — 99212 OFFICE O/P EST SF 10 MIN: CPT | Mod: S$GLB,,, | Performed by: FAMILY MEDICINE

## 2018-06-21 PROCEDURE — 77063 BREAST TOMOSYNTHESIS BI: CPT | Mod: 26,,, | Performed by: RADIOLOGY

## 2018-06-21 PROCEDURE — 99999 PR PBB SHADOW E&M-EST. PATIENT-LVL III: CPT | Mod: PBBFAC,,, | Performed by: FAMILY MEDICINE

## 2018-06-21 PROCEDURE — 3008F BODY MASS INDEX DOCD: CPT | Mod: CPTII,S$GLB,, | Performed by: FAMILY MEDICINE

## 2018-06-21 NOTE — PATIENT INSTRUCTIONS
This is your xiphoid process  This is the cartilaginous section at the lower end of the sternum, which is not attached to any ribs and gradually ossifies during adult life.

## 2018-06-21 NOTE — PROGRESS NOTES
"Subjective:       Patient ID: Deanna Garcia is a 57 y.o. female.    Chief Complaint: No chief complaint on file.    Deanna is a 57 y.o. female who presents today with 1 day of a "mass" under her chest. This is not painful. There is no nausea or vomiting. There is no chest pain or SOB. There is no diarrhea. She doesn't know what makes this mass better or worse. She noticed it for the first time today. She did not palpate a similar mass on her daughter and she got very worried.       Review of Systems   Constitutional: Negative for chills and fever.   Respiratory: Negative for chest tightness and shortness of breath.    Cardiovascular: Negative for chest pain.   Gastrointestinal: Negative for constipation, diarrhea, nausea and rectal pain.   Genitourinary: Negative for difficulty urinating.   Skin: Negative for rash.         Objective:     Vitals:    06/21/18 1307   BP: 102/60   Pulse: 78   Temp: 98.4 °F (36.9 °C)        Physical Exam   Constitutional: She is oriented to person, place, and time. She appears well-developed and well-nourished.   HENT:   Head: Normocephalic and atraumatic.   Cardiovascular: Normal rate and regular rhythm.    Pulmonary/Chest: Effort normal and breath sounds normal.       Musculoskeletal: She exhibits no edema.   Neurological: She is alert and oriented to person, place, and time.   Psychiatric: She has a normal mood and affect. Her behavior is normal. Judgment and thought content normal.   Nursing note and vitals reviewed.      Assessment:       1. Visit for counseling        Plan:         Visit for counseling  Area of mass appears to be the xiphoid process  This was shown to her using diagram and anatomical models on the computer  Reassurance was provided    10 minutes spent with patient, of which >50% was spent on counseling and coordination of care.     Warning signs discussed, patient to call with any further issues or worsening of symptoms.       "

## 2018-07-12 ENCOUNTER — INITIAL CONSULT (OUTPATIENT)
Dept: OPHTHALMOLOGY | Facility: CLINIC | Age: 58
End: 2018-07-12
Payer: COMMERCIAL

## 2018-07-12 VITALS — SYSTOLIC BLOOD PRESSURE: 119 MMHG | DIASTOLIC BLOOD PRESSURE: 67 MMHG | HEART RATE: 69 BPM

## 2018-07-12 DIAGNOSIS — H35.81 COTTON WOOL SPOTS: Primary | ICD-10-CM

## 2018-07-12 DIAGNOSIS — H25.13 NUCLEAR SCLEROSIS OF BOTH EYES: ICD-10-CM

## 2018-07-12 PROCEDURE — 92225 PR SPECIAL EYE EXAM, INITIAL: CPT | Mod: RT,S$GLB,, | Performed by: OPHTHALMOLOGY

## 2018-07-12 PROCEDURE — 92004 COMPRE OPH EXAM NEW PT 1/>: CPT | Mod: S$GLB,,, | Performed by: OPHTHALMOLOGY

## 2018-07-12 PROCEDURE — 99999 PR PBB SHADOW E&M-EST. PATIENT-LVL II: CPT | Mod: PBBFAC,,, | Performed by: OPHTHALMOLOGY

## 2018-07-12 NOTE — PROGRESS NOTES
HPI     Patient's age: 57 y.o. Female pt referred by Dr. Benson (Primary Doctor)   for diabetic eval    Pt states that she had a heart attack a month ago (stented).  Dx with DM   at that time but hadn't seen a doctor for 16 yrs.     Va is good OU.  Wears reading glasses.  No f/f/pain OU    Haven't had eye checks in the past.    (-)Headaches, sometimes  (-)Eye pain/discomfort                                                                                  (-)Flashes  (-)Floaters  (-)Diplopia/Double vision    FOHx: Diabetic retinopathy in dad. Cataracts.    LBS:  115 x a.m. Hour today    Hemoglobin A1C       Date                     Value               Ref Range             Status                06/05/2018               9.3 (H)             4.0 - 5.6 %           Final                    Last edited by Leroy Matthews MD on 7/12/2018  9:46 AM. (History)        OCT WNL OU.  Documentation only    Assessment /Plan     For exam results, see Encounter Report.    Cotton wool spots    Nuclear sclerosis of both eyes      A/P    DM - new dx.  No DR  CWS OS    Quit smoking x 1 month    NS with good vision.  Observe  Optometry follow up for refraction and ongoing comprehensive care  RTC retina PRN

## 2018-07-12 NOTE — LETTER
July 12, 2018      Geoff Garcia, DO  [address]  [place] [address]           [facility] - Ophthalmology  [address]  [place] [address]  Phone: [phone]  Fax: [phone]          Patient: Deanna Garcia   MR Number: [id number]   YOB: 1960   Date of Visit: 7/12/2018       Dear Dr. Geoff Garcia:    Thank you for referring Deanna Garcia to me for evaluation. Attached you will find relevant portions of my assessment and plan of care.    If you have questions, please do not hesitate to call me. I look forward to following Deanna Garcia along with you.    Sincerely,    Leroy Matthews MD    Enclosure  CC:  No Recipients    If you would like to receive this communication electronically, please contact [email or web address] or [phone] to request more information on [employer] Link access.    For providers and/or their staff who would like to refer a patient to [facility], please contact us through our one-stop-shop provider referral line, [facility], at [phone].    If you feel you have received this communication in error or would no longer like to receive these types of communications, please e-mail [email or web address]

## 2018-07-13 ENCOUNTER — OFFICE VISIT (OUTPATIENT)
Dept: FAMILY MEDICINE | Facility: CLINIC | Age: 58
End: 2018-07-13
Payer: COMMERCIAL

## 2018-07-13 VITALS
HEART RATE: 78 BPM | SYSTOLIC BLOOD PRESSURE: 110 MMHG | BODY MASS INDEX: 20.54 KG/M2 | HEIGHT: 65 IN | TEMPERATURE: 98 F | OXYGEN SATURATION: 97 % | WEIGHT: 123.25 LBS | DIASTOLIC BLOOD PRESSURE: 62 MMHG

## 2018-07-13 DIAGNOSIS — Z72.0 TOBACCO ABUSE: ICD-10-CM

## 2018-07-13 DIAGNOSIS — E11.65 TYPE 2 DIABETES MELLITUS WITH HYPERGLYCEMIA, WITHOUT LONG-TERM CURRENT USE OF INSULIN: Primary | ICD-10-CM

## 2018-07-13 DIAGNOSIS — I21.4 NSTEMI (NON-ST ELEVATED MYOCARDIAL INFARCTION): ICD-10-CM

## 2018-07-13 DIAGNOSIS — E78.5 HYPERLIPIDEMIA, UNSPECIFIED HYPERLIPIDEMIA TYPE: ICD-10-CM

## 2018-07-13 PROBLEM — R79.89 ELEVATED LIVER FUNCTION TESTS: Status: RESOLVED | Noted: 2018-06-13 | Resolved: 2018-07-13

## 2018-07-13 PROCEDURE — 3008F BODY MASS INDEX DOCD: CPT | Mod: CPTII,S$GLB,, | Performed by: FAMILY MEDICINE

## 2018-07-13 PROCEDURE — 99214 OFFICE O/P EST MOD 30 MIN: CPT | Mod: S$GLB,,, | Performed by: FAMILY MEDICINE

## 2018-07-13 PROCEDURE — 3046F HEMOGLOBIN A1C LEVEL >9.0%: CPT | Mod: CPTII,S$GLB,, | Performed by: FAMILY MEDICINE

## 2018-07-13 PROCEDURE — 99999 PR PBB SHADOW E&M-EST. PATIENT-LVL III: CPT | Mod: PBBFAC,,, | Performed by: FAMILY MEDICINE

## 2018-07-13 NOTE — PATIENT INSTRUCTIONS
Take metformin 2000 mg daily (2 pills in the morning and two pills at night)  Continue atorvastatin  Continue diet change  No coca cola!  No smoking!  Monitor sugars once fasting daily and 2-3 times per week randomly  Blood work before your next appointment in September    Diabetes Management Status    Statin: Taking  ACE/ARB: Not taking    Screening or Prevention Patient's value Goal Complete/Controlled?   HgA1C Testing and Control   Lab Results   Component Value Date    HGBA1C 9.3 (H) 06/05/2018      Annually/Less than 8% No   Lipid profile : 06/06/2018 Annually Yes   LDL control Lab Results   Component Value Date    LDLCALC 125.4 06/06/2018    Annually/Less than 100 mg/dl  No   Nephropathy screening Lab Results   Component Value Date    LABMICR 8.0 06/13/2018     Lab Results   Component Value Date    PROTEINUA Negative 06/05/2018    Annually Yes   Blood pressure BP Readings from Last 1 Encounters:   07/12/18 119/67    Less than 140/90 Yes   Dilated retinal exam : 07/12/2018 Annually Yes   Foot exam   : 06/13/2018 Annually Yes

## 2018-07-13 NOTE — PROGRESS NOTES
"Subjective:       Patient ID: Deanna Garcia is a 57 y.o. female.    Chief Complaint: Follow-up    Deanna is a 57 y.o. female who presents today for follow up on her DM, DLD, and tobacco abuse.     DM: reviewed her blood sugar log. Her sugars are between 120 and 160 usually. She is feeling better and is sleeping better. She has stopped drinking soda. She is only taking metformin 500 mg BID.   DLD: her cholesterol medication is not causing any side effects.   Tobacco: she has quit tobacco use!  S/p MI: there is no chest pain.     She feels "better," a "100%" better.       Diabetes   She presents for her follow-up diabetic visit. She has type 2 diabetes mellitus. Her disease course has been fluctuating. There are no hypoglycemic associated symptoms. There are no diabetic associated symptoms. Pertinent negatives for diabetes include no chest pain. Diabetic complications include heart disease. Risk factors for coronary artery disease include diabetes mellitus, dyslipidemia, hypertension and tobacco exposure. Current diabetic treatment includes oral agent (monotherapy). She is compliant with treatment most of the time. She is following a generally healthy diet. She has not had a previous visit with a dietitian. An ACE inhibitor/angiotensin II receptor blocker is not being taken. She does not see a podiatrist.Eye exam is current.   Hyperlipidemia   This is a new problem. The current episode started more than 1 month ago. The problem is uncontrolled. Exacerbating diseases include diabetes. Pertinent negatives include no chest pain, focal sensory loss, focal weakness, leg pain, myalgias or shortness of breath. Current antihyperlipidemic treatment includes statins. The current treatment provides mild improvement of lipids. There are no compliance problems.  Risk factors for coronary artery disease include dyslipidemia and diabetes mellitus.     Review of Systems   Constitutional: Negative for chills, fever and unexpected " weight change.   Respiratory: Negative for shortness of breath.    Cardiovascular: Negative for chest pain.   Gastrointestinal: Negative for constipation, diarrhea, nausea and vomiting.   Genitourinary: Negative for difficulty urinating.   Musculoskeletal: Negative for myalgias.   Skin: Negative for rash.   Neurological: Negative for focal weakness.        Objective:     Vitals:    07/13/18 1021   BP: 110/62   Pulse: 78   Temp: 98 °F (36.7 °C)        Physical Exam   Constitutional: She is oriented to person, place, and time. She appears well-developed and well-nourished. No distress.   HENT:   Head: Normocephalic and atraumatic.   Eyes: Conjunctivae are normal.   Cardiovascular: Normal rate and regular rhythm.    Pulmonary/Chest: Effort normal and breath sounds normal.   Abdominal: Soft. There is no tenderness.   Musculoskeletal: She exhibits no edema.   Neurological: She is alert and oriented to person, place, and time.   Skin: Skin is warm. She is not diaphoretic.   Psychiatric: She has a normal mood and affect. Her behavior is normal. Judgment and thought content normal.   Nursing note and vitals reviewed.      Assessment:       1. Type 2 diabetes mellitus with hyperglycemia, without long-term current use of insulin    2. Hyperlipidemia, unspecified hyperlipidemia type    3. BMI 20.0-20.9, adult    4. Tobacco abuse    5. NSTEMI (non-ST elevated myocardial infarction)        Plan:         Take metformin 2000 mg daily (2 pills in the morning and two pills at night)  Continue atorvastatin  Continue plavix and aspirin along with nitro PRN  Continue diet changes, keep apt with educator  No coca cola!  Continue not smoking!  Monitor sugars once fasting daily and 2-3 times per week randomly  Blood work before your next appointment in September  Follow up with cards annually    Type 2 diabetes mellitus with hyperglycemia, without long-term current use of insulin    Hyperlipidemia, unspecified hyperlipidemia type    BMI  20.0-20.9, adult    Tobacco abuse    NSTEMI (non-ST elevated myocardial infarction)      Warning signs discussed, patient to call with any further issues or worsening of symptoms.

## 2018-07-20 ENCOUNTER — TELEPHONE (OUTPATIENT)
Dept: CARDIOLOGY | Facility: CLINIC | Age: 58
End: 2018-07-20

## 2018-07-20 NOTE — TELEPHONE ENCOUNTER
"Per CHARLEY Crandall, pt daughter was advised: " Please let patient know her event monitor as completely normal. It did not show any low heart rates. Her heart rate might have initially been low because of the blockage in her heart. If she is not having any dizziness or unexplained fatigue then she should start Toprol XL 12.5mg po daily due to her history of MI and recent stent placement. She should follow up with Dr. Marie as recommended at her most recent office visit. If she is uncertain of required follow up then I would recommend follow up in 4-6 months or sooner if she has any complaints " pt daughter stated she understood and that pt is doing well, daughter just wants to know if this rx will be sent to the pharmacy or is already at the pharmacy. A message was sent to CHARLEY Crandall.  "

## 2018-07-23 RX ORDER — METOPROLOL SUCCINATE 25 MG/1
12.5 TABLET, EXTENDED RELEASE ORAL DAILY
Qty: 15 TABLET | Refills: 11 | Status: SHIPPED | OUTPATIENT
Start: 2018-07-23 | End: 2019-06-21 | Stop reason: SDUPTHER

## 2018-07-24 ENCOUNTER — OFFICE VISIT (OUTPATIENT)
Dept: OPTOMETRY | Facility: CLINIC | Age: 58
End: 2018-07-24
Payer: COMMERCIAL

## 2018-07-24 DIAGNOSIS — E11.9 TYPE 2 DIABETES MELLITUS WITHOUT RETINOPATHY: Primary | ICD-10-CM

## 2018-07-24 DIAGNOSIS — H52.203 ASTIGMATISM OF BOTH EYES WITH PRESBYOPIA: ICD-10-CM

## 2018-07-24 DIAGNOSIS — H25.13 NUCLEAR SCLEROSIS, BILATERAL: ICD-10-CM

## 2018-07-24 DIAGNOSIS — H52.4 ASTIGMATISM OF BOTH EYES WITH PRESBYOPIA: ICD-10-CM

## 2018-07-24 PROCEDURE — 92012 INTRM OPH EXAM EST PATIENT: CPT | Mod: S$GLB,,, | Performed by: OPTOMETRIST

## 2018-07-24 PROCEDURE — 99999 PR PBB SHADOW E&M-EST. PATIENT-LVL II: CPT | Mod: PBBFAC,,, | Performed by: OPTOMETRIST

## 2018-07-24 PROCEDURE — 92015 DETERMINE REFRACTIVE STATE: CPT | Mod: S$GLB,,, | Performed by: OPTOMETRIST

## 2018-07-24 NOTE — PROGRESS NOTES
HPI     DLS: 7/12/2018 with Dr. Matthews   Pt states glare is a bother at night. States near va has decreased and now   has to wear +3.25 otc readers--now having distance problems  Denies f/f  +Rednes  +Dry Eyes     AT ou PRN    DM   No Ocular hx     FOHx: Diabetic retinopathy in dad    LBS= 141 this morning       Last edited by Jose Huston, OD on 7/24/2018  1:29 PM. (History)        ROS     Positive for: Endocrine (DM)    Negative for: Constitutional, Gastrointestinal, Neurological, Skin,   Genitourinary, Musculoskeletal, HENT, Cardiovascular, Eyes, Respiratory,   Psychiatric, Allergic/Imm, Heme/Lymph    Last edited by Jose Huston, OD on 7/24/2018  1:29 PM. (History)        Assessment /Plan     For exam results, see Encounter Report.    Type 2 diabetes mellitus without retinopathy    Astigmatism of both eyes with presbyopia    Nuclear sclerosis, bilateral      1. Small Cats  2. DM- WITHOUT RETINOPATHY.  Advised yearly DFE (dilated by Dr Matthews a few weeks ago)  3. Pt wearing otc readers--now needs distance correction.  Wrote spex Rx.  Discussed PALs/adaptation    PLAN:    rtc 1 yr

## 2018-08-01 ENCOUNTER — OFFICE VISIT (OUTPATIENT)
Dept: GASTROENTEROLOGY | Facility: CLINIC | Age: 58
End: 2018-08-01
Payer: COMMERCIAL

## 2018-08-01 VITALS
WEIGHT: 123.69 LBS | DIASTOLIC BLOOD PRESSURE: 62 MMHG | SYSTOLIC BLOOD PRESSURE: 110 MMHG | HEIGHT: 64 IN | HEART RATE: 70 BPM | BODY MASS INDEX: 21.11 KG/M2

## 2018-08-01 DIAGNOSIS — K59.00 CONSTIPATION, UNSPECIFIED CONSTIPATION TYPE: Primary | ICD-10-CM

## 2018-08-01 PROCEDURE — 99999 PR PBB SHADOW E&M-EST. PATIENT-LVL II: CPT | Mod: PBBFAC,,, | Performed by: INTERNAL MEDICINE

## 2018-08-01 PROCEDURE — 99204 OFFICE O/P NEW MOD 45 MIN: CPT | Mod: S$GLB,,, | Performed by: INTERNAL MEDICINE

## 2018-08-01 PROCEDURE — 3008F BODY MASS INDEX DOCD: CPT | Mod: CPTII,S$GLB,, | Performed by: INTERNAL MEDICINE

## 2018-08-01 RX ORDER — POLYETHYLENE GLYCOL 3350 17 G/17G
17 POWDER, FOR SOLUTION ORAL DAILY
Qty: 510 G | Refills: 0 | Status: SHIPPED | OUTPATIENT
Start: 2018-08-01

## 2018-08-01 NOTE — PROGRESS NOTES
Subjective:       Patient ID: Deanna Garcia is a 57 y.o. female.    Chief Complaint:  Constipation    This is a 56 yo female here for evaluation of constipation and colonoscopy screening. Patient reports constipation since her recent hospital discharge (06/06/18) for NSTEMI with PCI to distal RCA on Plavix and aspirin. Her stool frequency is 3 to 4 times per week associated with some straining and pellet-like stool, moderate intensity. She has been on a healthier diet as well with some intentional weight loss.  Pertinent negatives include no abdominal pain, diarrhea, difficulty urinating, fecal incontinence, fever, or N/V. She has not tried anything for her symptoms. There is no family history of colorectal cancer or IBD.     The following portions of the patient's history were reviewed and updated as appropriate: allergies, current medications, past family history, past medical history, past social history, past surgical history and problem list.    Review of Systems   Constitutional: Negative for activity change, appetite change and fever.   HENT: Negative for trouble swallowing and voice change.    Respiratory: Negative for choking, chest tightness and shortness of breath.    Cardiovascular: Negative for chest pain, palpitations and leg swelling.   Gastrointestinal: Positive for constipation. Negative for abdominal pain, blood in stool, diarrhea, nausea, rectal pain and vomiting.   Genitourinary: Negative for dysuria, frequency, hematuria and urgency.   Musculoskeletal: Negative for arthralgias and myalgias.   Skin: Negative for pallor and rash.   Neurological: Negative for weakness, light-headedness and headaches.   Hematological: Bruises/bleeds easily.   Psychiatric/Behavioral: Negative for agitation, behavioral problems and confusion.       Objective:      Physical Exam   Constitutional: She is oriented to person, place, and time. She appears well-developed and well-nourished. No distress.   HENT:   Head:  Normocephalic and atraumatic.   Eyes: Conjunctivae are normal. No scleral icterus.   Neck: Normal range of motion. Neck supple. No JVD present. No tracheal deviation present. No thyromegaly present.   Cardiovascular: Normal rate, regular rhythm, normal heart sounds and intact distal pulses.  Exam reveals no gallop and no friction rub.    No murmur heard.  Pulmonary/Chest: Effort normal and breath sounds normal. No respiratory distress. She has no wheezes.   Abdominal: Soft. Bowel sounds are normal. She exhibits no distension. There is no tenderness. There is no guarding.   Musculoskeletal: She exhibits no edema or tenderness.        Right wrist: She exhibits normal range of motion and no tenderness.        Left wrist: She exhibits normal range of motion and no tenderness.   Lymphadenopathy:        Head (right side): No submental and no submandibular adenopathy present.        Head (left side): No submental and no submandibular adenopathy present.   Neurological: She is alert and oriented to person, place, and time.   Skin: Skin is warm and dry. Bruising noted. No rash noted. She is not diaphoretic. No erythema.   Psychiatric: She has a normal mood and affect. Her behavior is normal.   Nursing note and vitals reviewed.      Labs; hct normal  Assessment:         1. Constipation, unspecified constipation type  2. Screening for malignant neoplasm of colon   Plan:   1. Trial of Miralax  2. Messaged cardiology regarding candidacy

## 2018-08-02 ENCOUNTER — TELEPHONE (OUTPATIENT)
Dept: GASTROENTEROLOGY | Facility: CLINIC | Age: 58
End: 2018-08-02

## 2018-08-02 NOTE — TELEPHONE ENCOUNTER
----- Message from Ariella Richardson MD sent at 8/2/2018  8:03 AM CDT -----  We can let the pt know that we will plan on her procedure after she completes a year of anti-platelet agents. Not sure if we should see her in clinic before then or schedule with cardiac clearance  ----- Message -----  From: Baron Marie MD  Sent: 8/1/2018   5:53 PM  To: Ariella Richardson MD    Yes, she needs per current guidelines at least 1 year of dual antiplatelets. If the procedure needs to be done sooner, there is good data to support holding plavix at 6 months.  Thanks  JOSH    ----- Message -----  From: Ariella Richardson MD  Sent: 8/1/2018   9:02 AM  To: MD Dr. Frank Quezada,  I saw a mutual pt in GI clinic regarding colon cancer screening. She recently had a KATI placed in 6/18. I figured she was on the one year of plavix plan and we could schedule her for a year from now if that was ok with you. I'm ok continuing aspirin during procedures. Thanks in advance  Ariella

## 2018-09-06 ENCOUNTER — PATIENT MESSAGE (OUTPATIENT)
Dept: FAMILY MEDICINE | Facility: CLINIC | Age: 58
End: 2018-09-06

## 2018-09-12 ENCOUNTER — LAB VISIT (OUTPATIENT)
Dept: LAB | Facility: HOSPITAL | Age: 58
End: 2018-09-12
Attending: FAMILY MEDICINE
Payer: COMMERCIAL

## 2018-09-12 DIAGNOSIS — E78.5 HYPERLIPIDEMIA, UNSPECIFIED HYPERLIPIDEMIA TYPE: ICD-10-CM

## 2018-09-12 DIAGNOSIS — I25.10 CORONARY ARTERY DISEASE INVOLVING NATIVE CORONARY ARTERY OF NATIVE HEART WITHOUT ANGINA PECTORIS: ICD-10-CM

## 2018-09-12 DIAGNOSIS — E11.65 TYPE 2 DIABETES MELLITUS WITH HYPERGLYCEMIA, WITHOUT LONG-TERM CURRENT USE OF INSULIN: ICD-10-CM

## 2018-09-12 LAB
CHOLEST SERPL-MCNC: 119 MG/DL
CHOLEST/HDLC SERPL: 2.4 {RATIO}
ESTIMATED AVG GLUCOSE: 128 MG/DL
HBA1C MFR BLD HPLC: 6.1 %
HDLC SERPL-MCNC: 49 MG/DL
HDLC SERPL: 41.2 %
LDLC SERPL CALC-MCNC: 59.6 MG/DL
NONHDLC SERPL-MCNC: 70 MG/DL
TRIGL SERPL-MCNC: 52 MG/DL

## 2018-09-12 PROCEDURE — 80061 LIPID PANEL: CPT

## 2018-09-12 PROCEDURE — 36415 COLL VENOUS BLD VENIPUNCTURE: CPT | Mod: PO

## 2018-09-12 PROCEDURE — 83036 HEMOGLOBIN GLYCOSYLATED A1C: CPT

## 2018-09-14 ENCOUNTER — OFFICE VISIT (OUTPATIENT)
Dept: FAMILY MEDICINE | Facility: CLINIC | Age: 58
End: 2018-09-14
Payer: COMMERCIAL

## 2018-09-14 VITALS
HEART RATE: 83 BPM | HEIGHT: 64 IN | TEMPERATURE: 98 F | WEIGHT: 123 LBS | DIASTOLIC BLOOD PRESSURE: 62 MMHG | SYSTOLIC BLOOD PRESSURE: 104 MMHG | BODY MASS INDEX: 21 KG/M2 | OXYGEN SATURATION: 97 %

## 2018-09-14 DIAGNOSIS — Z95.5 HISTORY OF CORONARY ARTERY STENT PLACEMENT: ICD-10-CM

## 2018-09-14 DIAGNOSIS — E11.65 TYPE 2 DIABETES MELLITUS WITH HYPERGLYCEMIA, WITHOUT LONG-TERM CURRENT USE OF INSULIN: Primary | ICD-10-CM

## 2018-09-14 DIAGNOSIS — E78.5 HYPERLIPIDEMIA, UNSPECIFIED HYPERLIPIDEMIA TYPE: ICD-10-CM

## 2018-09-14 DIAGNOSIS — Z23 NEED FOR INFLUENZA VACCINATION: ICD-10-CM

## 2018-09-14 DIAGNOSIS — Z72.0 TOBACCO ABUSE: ICD-10-CM

## 2018-09-14 PROCEDURE — 90686 IIV4 VACC NO PRSV 0.5 ML IM: CPT | Mod: S$GLB,,, | Performed by: FAMILY MEDICINE

## 2018-09-14 PROCEDURE — 3008F BODY MASS INDEX DOCD: CPT | Mod: CPTII,S$GLB,, | Performed by: FAMILY MEDICINE

## 2018-09-14 PROCEDURE — 99999 PR PBB SHADOW E&M-EST. PATIENT-LVL III: CPT | Mod: PBBFAC,,, | Performed by: FAMILY MEDICINE

## 2018-09-14 PROCEDURE — 3044F HG A1C LEVEL LT 7.0%: CPT | Mod: CPTII,S$GLB,, | Performed by: FAMILY MEDICINE

## 2018-09-14 PROCEDURE — 99214 OFFICE O/P EST MOD 30 MIN: CPT | Mod: 25,S$GLB,, | Performed by: FAMILY MEDICINE

## 2018-09-14 PROCEDURE — 90471 IMMUNIZATION ADMIN: CPT | Mod: S$GLB,,, | Performed by: FAMILY MEDICINE

## 2018-09-14 RX ORDER — METFORMIN HYDROCHLORIDE 500 MG/1
1000 TABLET, EXTENDED RELEASE ORAL
Qty: 180 TABLET | Refills: 3 | Status: SHIPPED | OUTPATIENT
Start: 2018-09-14 | End: 2018-12-17 | Stop reason: SDUPTHER

## 2018-09-14 RX ORDER — METFORMIN HYDROCHLORIDE 500 MG/1
1000 TABLET, EXTENDED RELEASE ORAL
Qty: 180 TABLET | Refills: 3 | Status: SHIPPED | OUTPATIENT
Start: 2018-09-14 | End: 2018-09-14 | Stop reason: SDUPTHER

## 2018-09-14 NOTE — PROGRESS NOTES
"Subjective:       Patient ID: Deanna Garcia is a 58 y.o. female.    Chief Complaint: Follow-up and Diabetes    Deanna is a 58 y.o. female who presents today for follow up on her chronic medical conditions.     DM: she has continued not drinking coke! She is "addicted to water now." She is exercising by walking a lot and her work is physical.   DLD: no issues with the cholesterol medication. No muscle aches.   Tobacco Abuse: she is not smoking regularly. She is having 1 cig/ week   Weight: stable.    Her a1c is down from 9.3 to 6.1!!!! Her LDL has decreased significantly. She feels about 50% better then before!      Diabetes   She presents for her follow-up diabetic visit. She has type 2 diabetes mellitus. Her disease course has been improving. There are no hypoglycemic associated symptoms. Pertinent negatives for hypoglycemia include no dizziness or hunger. Pertinent negatives for diabetes include no blurred vision, no chest pain, no fatigue, no foot paresthesias, no foot ulcerations, no polydipsia, no polyphagia, no polyuria, no visual change, no weakness and no weight loss. Symptoms are stable. Diabetic complications include heart disease. Risk factors for coronary artery disease include diabetes mellitus, dyslipidemia and tobacco exposure. Current diabetic treatment includes oral agent (monotherapy). She is compliant with treatment all of the time. She is following a generally healthy diet. She has had a previous visit with a dietitian. An ACE inhibitor/angiotensin II receptor blocker is not being taken. She does not see a podiatrist.Eye exam is current.   Hyperlipidemia   This is a chronic problem. The current episode started more than 1 month ago. The problem is controlled. Recent lipid tests were reviewed and are normal. Exacerbating diseases include diabetes and obesity. Factors aggravating her hyperlipidemia include smoking. Pertinent negatives include no chest pain, focal sensory loss, focal weakness, " myalgias or shortness of breath. Current antihyperlipidemic treatment includes statins. The current treatment provides significant improvement of lipids. There are no compliance problems.  Risk factors for coronary artery disease include diabetes mellitus, dyslipidemia and hypertension.     Review of Systems   Constitutional: Negative for fatigue and weight loss.   Eyes: Negative for blurred vision.   Respiratory: Negative for shortness of breath.    Cardiovascular: Negative for chest pain.   Endocrine: Negative for polydipsia, polyphagia and polyuria.   Musculoskeletal: Negative for myalgias.   Neurological: Negative for dizziness, focal weakness and weakness.         Results for orders placed or performed in visit on 09/12/18   Lipid panel   Result Value Ref Range    Cholesterol 119 (L) 120 - 199 mg/dL    Triglycerides 52 30 - 150 mg/dL    HDL 49 40 - 75 mg/dL    LDL Cholesterol 59.6 (L) 63.0 - 159.0 mg/dL    HDL/Chol Ratio 41.2 20.0 - 50.0 %    Total Cholesterol/HDL Ratio 2.4 2.0 - 5.0    Non-HDL Cholesterol 70 mg/dL   Hemoglobin A1c   Result Value Ref Range    Hemoglobin A1C 6.1 (H) 4.0 - 5.6 %    Estimated Avg Glucose 128 68 - 131 mg/dL       Objective:     Vitals:    09/14/18 0856   BP: 104/62   Pulse: 83   Temp: 98.2 °F (36.8 °C)        Physical Exam   Constitutional: She is oriented to person, place, and time. She appears well-developed and well-nourished. No distress.   HENT:   Head: Normocephalic and atraumatic.   Eyes: Conjunctivae are normal.   Cardiovascular: Normal rate and regular rhythm.   Pulmonary/Chest: Effort normal and breath sounds normal.   Musculoskeletal: She exhibits no edema.   Neurological: She is alert and oriented to person, place, and time.   Skin: Skin is warm. She is not diaphoretic.   Psychiatric: She has a normal mood and affect. Her behavior is normal. Judgment and thought content normal.   Nursing note and vitals reviewed.      Assessment:       1. Type 2 diabetes mellitus with  hyperglycemia, without long-term current use of insulin    2. History of coronary artery stent placement    3. Tobacco abuse    4. BMI 20.0-20.9, adult    5. Hyperlipidemia, unspecified hyperlipidemia type        Plan:       Continue metformin but decrease to 1000 mg daily  Continue statin  Continue aspirin and plavix  Continue BB  Follow up in 3 months. Labs before.     Type 2 diabetes mellitus with hyperglycemia, without long-term current use of insulin  -     metFORMIN (GLUCOPHAGE-XR) 500 MG 24 hr tablet; Take 2 tablets (1,000 mg total) by mouth daily with breakfast.  Dispense: 180 tablet; Refill: 3  -     Hemoglobin A1c; Future; Expected date: 09/14/2018    History of coronary artery stent placement    Tobacco abuse  -     Ambulatory referral to Smoking Cessation Program    BMI 20.0-20.9, adult    Hyperlipidemia, unspecified hyperlipidemia type      Warning signs discussed, patient to call with any further issues or worsening of symptoms.

## 2018-09-14 NOTE — PATIENT INSTRUCTIONS
Diabetes health maintenance:  -- advise regular and consistent glucose monitoring and medication compliance.  -- advise daily foot checks  -- advise yearly ophthalmologic exams  -- advise adequate dietary and exercise modification  -- advise regular dental visits  -- Medication management    Decrease metformin to 1000 mg daily    Diabetes Management Status    Statin: Taking  ACE/ARB: Not taking    Screening or Prevention Patient's value Goal Complete/Controlled?   HgA1C Testing and Control   Lab Results   Component Value Date    HGBA1C 6.1 (H) 09/12/2018      Annually/Less than 8% Yes   Lipid profile : 09/12/2018 Annually Yes   LDL control Lab Results   Component Value Date    LDLCALC 59.6 (L) 09/12/2018    Annually/Less than 100 mg/dl  Yes   Nephropathy screening Lab Results   Component Value Date    LABMICR 8.0 06/13/2018     Lab Results   Component Value Date    PROTEINUA Negative 06/05/2018    Annually Yes   Blood pressure BP Readings from Last 1 Encounters:   09/14/18 104/62    Less than 140/90 Yes   Dilated retinal exam : 07/24/2018 Annually Yes   Foot exam   : 06/13/2018 Annually Yes

## 2018-10-16 ENCOUNTER — CLINICAL SUPPORT (OUTPATIENT)
Dept: SMOKING CESSATION | Facility: CLINIC | Age: 58
End: 2018-10-16
Payer: COMMERCIAL

## 2018-10-16 DIAGNOSIS — F17.200 NICOTINE DEPENDENCE: Primary | ICD-10-CM

## 2018-10-16 PROCEDURE — 99999 PR PBB SHADOW E&M-EST. PATIENT-LVL I: CPT | Mod: PBBFAC,,,

## 2018-10-16 PROCEDURE — 99404 PREV MED CNSL INDIV APPRX 60: CPT | Mod: S$GLB,,,

## 2018-10-16 RX ORDER — NICOTINE 7MG/24HR
1 PATCH, TRANSDERMAL 24 HOURS TRANSDERMAL DAILY
Qty: 14 PATCH | Refills: 0 | Status: SHIPPED | OUTPATIENT
Start: 2018-10-16 | End: 2019-12-05 | Stop reason: ALTCHOICE

## 2018-10-16 NOTE — PROGRESS NOTES
Pt was seen today for smoking cessation initial intake. Pt is smoking about 6-8 cigarettes/week. She reports relighting the same cigarette a couple of times a day. Advised patient not to relight as we count those as an extra cigarette smoked. Patient will be participating in bi-weekly tobacco cessation meetings and will begin the prescribed tobacco cessation medication regimen of 7 mg nicotine patches. Pt is to start  with rate reduction and wait time of 15 min prior to smoking. CO was 13 ppm. Patient is to follow up in 2 weeks for individual session.

## 2018-10-30 ENCOUNTER — TELEPHONE (OUTPATIENT)
Dept: SMOKING CESSATION | Facility: CLINIC | Age: 58
End: 2018-10-30

## 2018-12-15 ENCOUNTER — LAB VISIT (OUTPATIENT)
Dept: LAB | Facility: HOSPITAL | Age: 58
End: 2018-12-15
Attending: FAMILY MEDICINE
Payer: COMMERCIAL

## 2018-12-15 DIAGNOSIS — E11.65 TYPE 2 DIABETES MELLITUS WITH HYPERGLYCEMIA, WITHOUT LONG-TERM CURRENT USE OF INSULIN: ICD-10-CM

## 2018-12-15 LAB
ESTIMATED AVG GLUCOSE: 143 MG/DL
HBA1C MFR BLD HPLC: 6.6 %

## 2018-12-15 PROCEDURE — 83036 HEMOGLOBIN GLYCOSYLATED A1C: CPT

## 2018-12-15 PROCEDURE — 36415 COLL VENOUS BLD VENIPUNCTURE: CPT | Mod: PO

## 2018-12-17 ENCOUNTER — OFFICE VISIT (OUTPATIENT)
Dept: FAMILY MEDICINE | Facility: CLINIC | Age: 58
End: 2018-12-17
Payer: COMMERCIAL

## 2018-12-17 VITALS
HEART RATE: 69 BPM | HEIGHT: 64 IN | SYSTOLIC BLOOD PRESSURE: 104 MMHG | DIASTOLIC BLOOD PRESSURE: 64 MMHG | OXYGEN SATURATION: 98 % | WEIGHT: 121.06 LBS | TEMPERATURE: 98 F | BODY MASS INDEX: 20.67 KG/M2

## 2018-12-17 DIAGNOSIS — E11.65 TYPE 2 DIABETES MELLITUS WITH HYPERGLYCEMIA, WITHOUT LONG-TERM CURRENT USE OF INSULIN: Primary | ICD-10-CM

## 2018-12-17 DIAGNOSIS — Z23 NEED FOR VACCINATION AGAINST STREPTOCOCCUS PNEUMONIAE: ICD-10-CM

## 2018-12-17 DIAGNOSIS — E78.49 OTHER HYPERLIPIDEMIA: ICD-10-CM

## 2018-12-17 PROBLEM — R73.9 HYPERGLYCEMIA: Status: RESOLVED | Noted: 2018-06-05 | Resolved: 2018-12-17

## 2018-12-17 PROCEDURE — 90471 IMMUNIZATION ADMIN: CPT | Mod: S$GLB,,, | Performed by: FAMILY MEDICINE

## 2018-12-17 PROCEDURE — 90732 PPSV23 VACC 2 YRS+ SUBQ/IM: CPT | Mod: S$GLB,,, | Performed by: FAMILY MEDICINE

## 2018-12-17 PROCEDURE — 99214 OFFICE O/P EST MOD 30 MIN: CPT | Mod: 25,S$GLB,, | Performed by: FAMILY MEDICINE

## 2018-12-17 PROCEDURE — 3044F HG A1C LEVEL LT 7.0%: CPT | Mod: CPTII,S$GLB,, | Performed by: FAMILY MEDICINE

## 2018-12-17 PROCEDURE — 3008F BODY MASS INDEX DOCD: CPT | Mod: CPTII,S$GLB,, | Performed by: FAMILY MEDICINE

## 2018-12-17 PROCEDURE — 99999 PR PBB SHADOW E&M-EST. PATIENT-LVL III: CPT | Mod: PBBFAC,,, | Performed by: FAMILY MEDICINE

## 2018-12-17 RX ORDER — METFORMIN HYDROCHLORIDE 500 MG/1
1000 TABLET, EXTENDED RELEASE ORAL 2 TIMES DAILY WITH MEALS
Qty: 360 TABLET | Refills: 0 | Status: SHIPPED | OUTPATIENT
Start: 2018-12-17 | End: 2019-02-13

## 2018-12-17 NOTE — PROGRESS NOTES
"Subjective:       Patient ID: Deanna Garcia is a 58 y.o. female.    Chief Complaint: Follow-up    Deanna is a 58 y.o. female who presents today for follow up on her chronic medical conditions.      DM: she is now drinking one soda/week. She is "addicted to water now." She is exercising by walking a lot and her work is physical. No issues with the metformin.   DLD: no issues with the cholesterol medication. No muscle aches.   Tobacco Abuse: she is not smoking regularly. She is having 1 cig/ daily now.   Weight: stable.        Review of Systems   Constitutional: Negative for chills, fatigue and fever.   Respiratory: Negative for shortness of breath.    Cardiovascular: Negative for chest pain.   Gastrointestinal: Negative for constipation, diarrhea, nausea and vomiting.   Endocrine: Negative for polydipsia, polyphagia and polyuria.   Musculoskeletal: Negative for myalgias.   Skin: Negative for rash.   Neurological: Negative for dizziness, weakness, light-headedness and headaches.         Results for orders placed or performed in visit on 12/15/18   Hemoglobin A1c   Result Value Ref Range    Hemoglobin A1C 6.6 (H) 4.0 - 5.6 %    Estimated Avg Glucose 143 (H) 68 - 131 mg/dL       Objective:     Vitals:    12/17/18 0811   BP: 104/64   Pulse: 69   Temp: 98.4 °F (36.9 °C)        Physical Exam   Constitutional: She is oriented to person, place, and time. She appears well-developed and well-nourished. No distress.   HENT:   Head: Normocephalic and atraumatic.   Eyes: Conjunctivae are normal.   Cardiovascular: Normal rate and regular rhythm.   Pulmonary/Chest: Effort normal and breath sounds normal.   Abdominal: Soft. There is no tenderness.   Musculoskeletal: She exhibits no edema.   Neurological: She is alert and oriented to person, place, and time.   Skin: Skin is warm. She is not diaphoretic.   Psychiatric: She has a normal mood and affect. Her behavior is normal. Judgment and thought content normal.   Nursing note " and vitals reviewed.      Assessment:       1. Type 2 diabetes mellitus with hyperglycemia, without long-term current use of insulin    2. Other hyperlipidemia    3. BMI 20.0-20.9, adult    4. Need for vaccination against Streptococcus pneumoniae        Plan:       Continue metformin, increase [id number] mg twice daily  Continue statin  Continue aspirin and plavix  Continue BB  Follow up in 3 months. Labs before.       Type 2 diabetes mellitus with hyperglycemia, without long-term current use of insulin  -     metFORMIN (GLUCOPHAGE-XR) 500 MG 24 hr tablet; Take 2 tablets (1,000 mg total) by mouth 2 (two) times daily with meals.  Dispense: 360 tablet; Refill: 0  -     (In Office Administered) Pneumococcal Polysaccharide Vaccine (23 Valent) (SQ/IM)  -     Hemoglobin A1c; Future; Expected date: 12/17/2018  -     Comprehensive metabolic panel; Future; Expected date: 12/17/2018    Other hyperlipidemia    BMI 20.0-20.9, adult    Need for vaccination against Streptococcus pneumoniae  -     (In Office Administered) Pneumococcal Polysaccharide Vaccine (23 Valent) (SQ/IM)      Warning signs discussed, patient to call with any further issues or worsening of symptoms.

## 2018-12-17 NOTE — PATIENT INSTRUCTIONS
Diabetes health maintenance:  -- advise regular and consistent glucose monitoring and medication compliance.  -- advise daily foot checks  -- advise yearly ophthalmologic exams  -- advise adequate dietary and exercise modification  -- advise regular dental visits  -- Medication management    Increase metformin to 1000 mg twice daily ( two pills in the AM and 2 pills in the PM )    Diabetes Management Status    Statin: Taking  ACE/ARB: Not taking    Screening or Prevention Patient's value Goal Complete/Controlled?   HgA1C Testing and Control   Lab Results   Component Value Date    HGBA1C 6.6 (H) 12/15/2018      Annually/Less than 8% Yes   Lipid profile : 09/12/2018 Annually Yes   LDL control Lab Results   Component Value Date    LDLCALC 59.6 (L) 09/12/2018    Annually/Less than 100 mg/dl  Yes   Nephropathy screening Lab Results   Component Value Date    LABMICR 8.0 06/13/2018     Lab Results   Component Value Date    PROTEINUA Negative 06/05/2018    Annually Yes   Blood pressure BP Readings from Last 1 Encounters:   12/17/18 104/64    Less than 140/90 Yes   Dilated retinal exam : 07/24/2018 Annually Yes   Foot exam   : 06/13/2018 Annually Yes

## 2018-12-18 ENCOUNTER — OFFICE VISIT (OUTPATIENT)
Dept: CARDIOLOGY | Facility: CLINIC | Age: 58
End: 2018-12-18
Payer: COMMERCIAL

## 2018-12-18 VITALS
HEIGHT: 65 IN | WEIGHT: 122 LBS | BODY MASS INDEX: 20.33 KG/M2 | HEART RATE: 78 BPM | DIASTOLIC BLOOD PRESSURE: 80 MMHG | SYSTOLIC BLOOD PRESSURE: 120 MMHG

## 2018-12-18 DIAGNOSIS — I21.4 NSTEMI (NON-ST ELEVATED MYOCARDIAL INFARCTION): ICD-10-CM

## 2018-12-18 DIAGNOSIS — R00.1 BRADYCARDIA: ICD-10-CM

## 2018-12-18 DIAGNOSIS — E11.65 TYPE 2 DIABETES MELLITUS WITH HYPERGLYCEMIA, WITHOUT LONG-TERM CURRENT USE OF INSULIN: ICD-10-CM

## 2018-12-18 DIAGNOSIS — Z95.5 HISTORY OF CORONARY ARTERY STENT PLACEMENT: ICD-10-CM

## 2018-12-18 DIAGNOSIS — I25.10 CORONARY ARTERY DISEASE INVOLVING NATIVE CORONARY ARTERY OF NATIVE HEART WITHOUT ANGINA PECTORIS: Primary | ICD-10-CM

## 2018-12-18 DIAGNOSIS — E78.49 OTHER HYPERLIPIDEMIA: ICD-10-CM

## 2018-12-18 DIAGNOSIS — I51.89 DIASTOLIC DYSFUNCTION: ICD-10-CM

## 2018-12-18 DIAGNOSIS — Z72.0 TOBACCO ABUSE: ICD-10-CM

## 2018-12-18 DIAGNOSIS — R07.9 CHEST PAIN, UNSPECIFIED TYPE: ICD-10-CM

## 2018-12-18 PROCEDURE — 99214 OFFICE O/P EST MOD 30 MIN: CPT | Mod: S$GLB,,, | Performed by: INTERNAL MEDICINE

## 2018-12-18 PROCEDURE — 99999 PR PBB SHADOW E&M-EST. PATIENT-LVL III: CPT | Mod: PBBFAC,,, | Performed by: INTERNAL MEDICINE

## 2018-12-18 PROCEDURE — 3008F BODY MASS INDEX DOCD: CPT | Mod: CPTII,S$GLB,, | Performed by: INTERNAL MEDICINE

## 2018-12-18 PROCEDURE — 3044F HG A1C LEVEL LT 7.0%: CPT | Mod: CPTII,S$GLB,, | Performed by: INTERNAL MEDICINE

## 2018-12-18 NOTE — PROGRESS NOTES
Subjective:    Patient ID:  Deanna Garcia is a 58 y.o. female who presents for follow-up of Coronary Artery Disease      HPI     59 y/o female with hx of CAD s/p NSTEMI 6/2018 with successful PCI to distal RCA with KATI, HLD, DM, diastolic dysfunction, tobacco abuse who presents for f/u. Originally seen for bradycardia and CP, diagnosed with NSTEMI, had PCI to distal RCA, 2DE with normal EF and no WMA's, started on good meds (BB held due to bradycardia) and discharged home with event monitor. Has done well since discharge and denies CP, SOB/MACEDO, orthopnea, PND, palps, syncope, LE edema. Compliant with and tolerating meds. Has cut back on smoking, lost weight, exercising and feels good.     Review of Systems   Constitution: Negative for weakness and malaise/fatigue.   HENT: Negative for congestion.    Eyes: Negative for blurred vision.   Cardiovascular: Negative for chest pain, claudication, cyanosis, dyspnea on exertion, irregular heartbeat, leg swelling, near-syncope, orthopnea, palpitations, paroxysmal nocturnal dyspnea and syncope.   Respiratory: Negative for shortness of breath.    Endocrine: Negative for polyuria.   Hematologic/Lymphatic: Negative for bleeding problem.   Skin: Negative for itching and rash.   Musculoskeletal: Negative for joint swelling, muscle cramps and muscle weakness.   Gastrointestinal: Negative for abdominal pain, hematemesis, hematochezia, melena, nausea and vomiting.   Genitourinary: Negative for dysuria and hematuria.   Neurological: Negative for dizziness, focal weakness, headaches, light-headedness and loss of balance.   Psychiatric/Behavioral: Negative for depression. The patient is not nervous/anxious.         Objective:    Physical Exam   Constitutional: She is oriented to person, place, and time. She appears well-developed and well-nourished.   HENT:   Head: Normocephalic and atraumatic.   Neck: Neck supple. No JVD present.   Cardiovascular: Normal rate, regular rhythm and  normal heart sounds.   Pulses:       Carotid pulses are 2+ on the right side, and 2+ on the left side.       Radial pulses are 2+ on the right side, and 2+ on the left side.        Femoral pulses are 2+ on the right side, and 2+ on the left side.       Dorsalis pedis pulses are 2+ on the right side, and 2+ on the left side.        Posterior tibial pulses are 2+ on the right side, and 2+ on the left side.   Pulmonary/Chest: Effort normal and breath sounds normal.   Abdominal: Soft. Bowel sounds are normal.   Musculoskeletal: She exhibits no edema.   Neurological: She is alert and oriented to person, place, and time.   Skin: Skin is warm and dry.   Psychiatric: She has a normal mood and affect. Her behavior is normal. Thought content normal.         Assessment:       1. Coronary artery disease involving native coronary artery of native heart without angina pectoris    2. NSTEMI (non-ST elevated myocardial infarction)    3. History of coronary artery stent placement    4. Diastolic dysfunction    5. Bradycardia    6. Other hyperlipidemia    7. Type 2 diabetes mellitus with hyperglycemia, without long-term current use of insulin    8. Chest pain, unspecified type    9. Tobacco abuse      59 y/o pt with hx and presentation as above. Doing well from a cardiac perspective and compensated from a HF perspective. Discussed the importance of med compliance, heart healthy diet, and regular exercise. DAPT x 1 year and will discuss D/C plavix next clinic visit.       Plan:       -Continue current medical management  -f/u in 6 months

## 2019-01-03 ENCOUNTER — TELEPHONE (OUTPATIENT)
Dept: ADMINISTRATIVE | Facility: HOSPITAL | Age: 59
End: 2019-01-03

## 2019-01-03 ENCOUNTER — DOCUMENTATION ONLY (OUTPATIENT)
Dept: ADMINISTRATIVE | Facility: HOSPITAL | Age: 59
End: 2019-01-03

## 2019-01-03 NOTE — TELEPHONE ENCOUNTER
Return call to Luz, regarding Keyanna Garcia,RE: colonoscopy, states received call regarding health maintenance. Daughter states her mother had a stent placed in June 2018 and the doctor stated she had to wait a year before it could be done. Luz requested a future date appt. After June- informed the cardiologist would have to approve but the initial appt can be made with endoscopy. Luz stated she also saw the alternative test that could be performed. Luz informed the fitkit test could done in place of the colonoscopy. Luz informed that the Colonoscopy would requires a prep, and the fitkit is a specimen only test; but it is the most definitive States she will confer with her mother regarding which test she would like to have which test she would like d  and          ----- Message from Tacho Perdomo LPN sent at 1/3/2019  3:48 PM CST -----  Contact: abdiaziz/Luz  [phone]  ext [phone]      ----- Message -----  From: Francisca Arzola MA  Sent: 1/3/2019   3:37 PM  To: Tacho Perdomo LPN        ----- Message -----  From: Alexi Rhodes  Sent: 1/3/2019   3:25 PM  To: Jose Tellez Staff    Patient daughter states she's returning your call.  She would like a callback at [phone] ext [phone].

## 2019-01-03 NOTE — PROGRESS NOTES
Message forwarded from A Conor LPN to contact Luz Guzman daughter of Deanna Garcia regarding colonoscopy, attempted to return call at phone # [phone], rang twice and disconnected

## 2019-02-13 DIAGNOSIS — E11.65 TYPE 2 DIABETES MELLITUS WITH HYPERGLYCEMIA, WITHOUT LONG-TERM CURRENT USE OF INSULIN: ICD-10-CM

## 2019-02-13 RX ORDER — METFORMIN HYDROCHLORIDE 500 MG/1
1000 TABLET, EXTENDED RELEASE ORAL 2 TIMES DAILY WITH MEALS
Qty: 360 TABLET | Refills: 0 | Status: SHIPPED | OUTPATIENT
Start: 2019-02-13 | End: 2019-03-04 | Stop reason: SDUPTHER

## 2019-02-20 ENCOUNTER — CLINICAL SUPPORT (OUTPATIENT)
Dept: SMOKING CESSATION | Facility: CLINIC | Age: 59
End: 2019-02-20
Payer: COMMERCIAL

## 2019-02-20 ENCOUNTER — TELEPHONE (OUTPATIENT)
Dept: SMOKING CESSATION | Facility: CLINIC | Age: 59
End: 2019-02-20

## 2019-02-20 DIAGNOSIS — F17.200 NICOTINE DEPENDENCE: Primary | ICD-10-CM

## 2019-02-20 PROCEDURE — 99407 PR TOBACCO USE CESSATION INTENSIVE >10 MINUTES: ICD-10-PCS | Mod: S$GLB,,,

## 2019-02-20 PROCEDURE — 99407 BEHAV CHNG SMOKING > 10 MIN: CPT | Mod: S$GLB,,,

## 2019-02-20 NOTE — PROGRESS NOTES
Called pt to f/u on her 3 month smoking cessation quit status. Pt stated she is still smoking. Informed her she has benefits available and is able to rejoin. Pt scheduled appointment for quit #2 on 3/13/2019. Informed her of benefit period, phone follow ups, and contact information. Will complete 3 month smart form and will continue to follow up on quit #2.

## 2019-02-28 ENCOUNTER — LAB VISIT (OUTPATIENT)
Dept: LAB | Facility: HOSPITAL | Age: 59
End: 2019-02-28
Attending: FAMILY MEDICINE
Payer: COMMERCIAL

## 2019-02-28 DIAGNOSIS — E11.65 TYPE 2 DIABETES MELLITUS WITH HYPERGLYCEMIA, WITHOUT LONG-TERM CURRENT USE OF INSULIN: ICD-10-CM

## 2019-02-28 LAB
ALBUMIN SERPL BCP-MCNC: 3.7 G/DL
ALP SERPL-CCNC: 101 U/L
ALT SERPL W/O P-5'-P-CCNC: 18 U/L
ANION GAP SERPL CALC-SCNC: 4 MMOL/L
AST SERPL-CCNC: 18 U/L
BILIRUB SERPL-MCNC: 0.6 MG/DL
BUN SERPL-MCNC: 11 MG/DL
CALCIUM SERPL-MCNC: 9.2 MG/DL
CHLORIDE SERPL-SCNC: 103 MMOL/L
CO2 SERPL-SCNC: 31 MMOL/L
CREAT SERPL-MCNC: 0.7 MG/DL
EST. GFR  (AFRICAN AMERICAN): >60 ML/MIN/1.73 M^2
EST. GFR  (NON AFRICAN AMERICAN): >60 ML/MIN/1.73 M^2
ESTIMATED AVG GLUCOSE: 166 MG/DL
GLUCOSE SERPL-MCNC: 133 MG/DL
HBA1C MFR BLD HPLC: 7.4 %
POTASSIUM SERPL-SCNC: 3.9 MMOL/L
PROT SERPL-MCNC: 6.8 G/DL
SODIUM SERPL-SCNC: 138 MMOL/L

## 2019-02-28 PROCEDURE — 36415 COLL VENOUS BLD VENIPUNCTURE: CPT | Mod: PO

## 2019-02-28 PROCEDURE — 80053 COMPREHEN METABOLIC PANEL: CPT

## 2019-02-28 PROCEDURE — 83036 HEMOGLOBIN GLYCOSYLATED A1C: CPT

## 2019-03-04 ENCOUNTER — OFFICE VISIT (OUTPATIENT)
Dept: FAMILY MEDICINE | Facility: CLINIC | Age: 59
End: 2019-03-04
Payer: COMMERCIAL

## 2019-03-04 VITALS
DIASTOLIC BLOOD PRESSURE: 68 MMHG | TEMPERATURE: 99 F | HEIGHT: 65 IN | OXYGEN SATURATION: 98 % | WEIGHT: 121.69 LBS | BODY MASS INDEX: 20.28 KG/M2 | SYSTOLIC BLOOD PRESSURE: 104 MMHG | HEART RATE: 79 BPM

## 2019-03-04 DIAGNOSIS — E11.65 TYPE 2 DIABETES MELLITUS WITH HYPERGLYCEMIA, WITHOUT LONG-TERM CURRENT USE OF INSULIN: Primary | ICD-10-CM

## 2019-03-04 DIAGNOSIS — E78.49 OTHER HYPERLIPIDEMIA: ICD-10-CM

## 2019-03-04 DIAGNOSIS — Z12.11 COLON CANCER SCREENING: ICD-10-CM

## 2019-03-04 PROCEDURE — 3045F PR MOST RECENT HEMOGLOBIN A1C LEVEL 7.0-9.0%: ICD-10-PCS | Mod: CPTII,S$GLB,, | Performed by: FAMILY MEDICINE

## 2019-03-04 PROCEDURE — 99214 OFFICE O/P EST MOD 30 MIN: CPT | Mod: S$GLB,,, | Performed by: FAMILY MEDICINE

## 2019-03-04 PROCEDURE — 99999 PR PBB SHADOW E&M-EST. PATIENT-LVL III: CPT | Mod: PBBFAC,,, | Performed by: FAMILY MEDICINE

## 2019-03-04 PROCEDURE — 99214 PR OFFICE/OUTPT VISIT, EST, LEVL IV, 30-39 MIN: ICD-10-PCS | Mod: S$GLB,,, | Performed by: FAMILY MEDICINE

## 2019-03-04 PROCEDURE — 3008F BODY MASS INDEX DOCD: CPT | Mod: CPTII,S$GLB,, | Performed by: FAMILY MEDICINE

## 2019-03-04 PROCEDURE — 3045F PR MOST RECENT HEMOGLOBIN A1C LEVEL 7.0-9.0%: CPT | Mod: CPTII,S$GLB,, | Performed by: FAMILY MEDICINE

## 2019-03-04 PROCEDURE — 99999 PR PBB SHADOW E&M-EST. PATIENT-LVL III: ICD-10-PCS | Mod: PBBFAC,,, | Performed by: FAMILY MEDICINE

## 2019-03-04 PROCEDURE — 3008F PR BODY MASS INDEX (BMI) DOCUMENTED: ICD-10-PCS | Mod: CPTII,S$GLB,, | Performed by: FAMILY MEDICINE

## 2019-03-04 RX ORDER — METFORMIN HYDROCHLORIDE 500 MG/1
1000 TABLET, EXTENDED RELEASE ORAL 2 TIMES DAILY WITH MEALS
Qty: 360 TABLET | Refills: 0 | Status: SHIPPED | OUTPATIENT
Start: 2019-03-04 | End: 2019-05-08 | Stop reason: SDUPTHER

## 2019-03-04 NOTE — PROGRESS NOTES
"Subjective:       Patient ID: Deanna Garcia is a 58 y.o. female.    Chief Complaint: Follow-up and Diabetes    Deanna is a 58 y.o. female who presents today for follow up on her chronic medical conditions.      DM: her a1c has increased. She has been eating flour tortillas, rice, and bread more then previously. she is now drinking one soda/week. She is "addicted to water now." She is exercising by walking a lot and her work is physical. No issues with the metformin. She was taking 1000 mg in the AM and 500 mg in the PM.   DLD: no issues with the cholesterol medication. No muscle aches.   Tobacco Abuse: she is not smoking regularly. She is having 2 cig/week.   Weight: stable.    Labs as below reviewed in detail.       Review of Systems   Constitutional: Negative for chills, fatigue and fever.   Respiratory: Negative for shortness of breath.    Cardiovascular: Negative for chest pain.   Gastrointestinal: Negative for constipation, diarrhea, nausea and vomiting.   Endocrine: Negative for polydipsia, polyphagia and polyuria.   Musculoskeletal: Negative for myalgias.   Skin: Negative for rash.   Neurological: Negative for dizziness, weakness, light-headedness and headaches.           Results for orders placed or performed in visit on 02/28/19   Hemoglobin A1c   Result Value Ref Range    Hemoglobin A1C 7.4 (H) 4.0 - 5.6 %    Estimated Avg Glucose 166 (H) 68 - 131 mg/dL   Comprehensive metabolic panel   Result Value Ref Range    Sodium 138 136 - 145 mmol/L    Potassium 3.9 3.5 - 5.1 mmol/L    Chloride 103 95 - 110 mmol/L    CO2 31 (H) 23 - 29 mmol/L    Glucose 133 (H) 70 - 110 mg/dL    BUN, Bld 11 6 - 20 mg/dL    Creatinine 0.7 0.5 - 1.4 mg/dL    Calcium 9.2 8.7 - 10.5 mg/dL    Total Protein 6.8 6.0 - 8.4 g/dL    Albumin 3.7 3.5 - 5.2 g/dL    Total Bilirubin 0.6 0.1 - 1.0 mg/dL    Alkaline Phosphatase 101 55 - 135 U/L    AST 18 10 - 40 U/L    ALT 18 10 - 44 U/L    Anion Gap 4 (L) 8 - 16 mmol/L    eGFR if African " American >60.0 >60 mL/min/1.73 m^2    eGFR if non African American >60.0 >60 mL/min/1.73 m^2       Objective:     Vitals:    03/04/19 0812   BP: 104/68   Pulse: 79   Temp: 98.6 °F (37 °C)        Physical Exam   Constitutional: She is oriented to person, place, and time. She appears well-developed and well-nourished. No distress.   HENT:   Head: Normocephalic and atraumatic.   Eyes: Conjunctivae are normal.   Cardiovascular: Normal rate and regular rhythm.   Pulmonary/Chest: Effort normal and breath sounds normal.   Abdominal: Soft. There is no tenderness.   Musculoskeletal: She exhibits no edema.   Neurological: She is alert and oriented to person, place, and time.   Skin: Skin is warm. She is not diaphoretic.   Psychiatric: She has a normal mood and affect. Her behavior is normal. Judgment and thought content normal.   Nursing note and vitals reviewed.      Assessment:       1. Type 2 diabetes mellitus with hyperglycemia, without long-term current use of insulin    2. BMI 20.0-20.9, adult    3. Other hyperlipidemia    4. Colon cancer screening        Plan:         Continue metformin, increase [id number] mg twice daily  Continue statin  Continue aspirin and plavix  Continue BB  Follow up in 3 months. Labs before.     Type 2 diabetes mellitus with hyperglycemia, without long-term current use of insulin  -     metFORMIN (GLUCOPHAGE-XR) 500 MG 24 hr tablet; Take 2 tablets (1,000 mg total) by mouth 2 (two) times daily with meals.  Dispense: 360 tablet; Refill: 0  -     Hemoglobin A1c; Future; Expected date: 03/04/2019    BMI 20.0-20.9, adult    Other hyperlipidemia    Colon cancer screening  -     Fecal Immunochemical Test (iFOBT); Future; Expected date: 03/04/2019      Warning signs discussed, patient to call with any further issues or worsening of symptoms.

## 2019-03-04 NOTE — PATIENT INSTRUCTIONS
Diabetes health maintenance:  -- advise regular and consistent glucose monitoring and medication compliance.  -- advise daily foot checks  -- advise yearly ophthalmologic exams  -- advise adequate dietary and exercise modification  -- advise regular dental visits  -- Medication management    Continue metformin, increase [id number] mg twice daily  Continue statin  Continue aspirin and plavix  Continue BB  Follow up in 3 months. Labs before.     Diabetes Management Status    Statin: Taking  ACE/ARB: Not taking    Screening or Prevention Patient's value Goal Complete/Controlled?   HgA1C Testing and Control   Lab Results   Component Value Date    HGBA1C 7.4 (H) 02/28/2019      Annually/Less than 8% Yes   Lipid profile : 09/12/2018 Annually Yes   LDL control Lab Results   Component Value Date    LDLCALC 59.6 (L) 09/12/2018    Annually/Less than 100 mg/dl  Yes   Nephropathy screening Lab Results   Component Value Date    LABMICR 8.0 06/13/2018     Lab Results   Component Value Date    PROTEINUA Negative 06/05/2018    Annually Yes   Blood pressure BP Readings from Last 1 Encounters:   03/04/19 104/68    Less than 140/90 Yes   Dilated retinal exam : 07/24/2018 Annually Yes   Foot exam   : 06/13/2018 Annually Yes

## 2019-03-06 PROCEDURE — 82274 ASSAY TEST FOR BLOOD FECAL: CPT

## 2019-03-11 ENCOUNTER — LAB VISIT (OUTPATIENT)
Dept: LAB | Facility: HOSPITAL | Age: 59
End: 2019-03-11
Attending: FAMILY MEDICINE
Payer: COMMERCIAL

## 2019-03-11 DIAGNOSIS — Z12.11 COLON CANCER SCREENING: ICD-10-CM

## 2019-03-11 LAB — HEMOCCULT STL QL IA: NEGATIVE

## 2019-03-12 ENCOUNTER — CLINICAL SUPPORT (OUTPATIENT)
Dept: SMOKING CESSATION | Facility: CLINIC | Age: 59
End: 2019-03-12
Payer: COMMERCIAL

## 2019-03-12 VITALS
SYSTOLIC BLOOD PRESSURE: 133 MMHG | BODY MASS INDEX: 25.83 KG/M2 | HEART RATE: 109 BPM | DIASTOLIC BLOOD PRESSURE: 82 MMHG | WEIGHT: 155 LBS | HEIGHT: 65 IN

## 2019-03-12 DIAGNOSIS — F17.210 LIGHT CIGARETTE SMOKER (1-9 CIGARETTES PER DAY): Primary | ICD-10-CM

## 2019-03-12 DIAGNOSIS — F17.210 MODERATE CIGARETTE SMOKER (10-19 PER DAY): Primary | ICD-10-CM

## 2019-03-12 PROCEDURE — 99404 PR PREVENT COUNSEL,INDIV,60 MIN: ICD-10-PCS | Mod: S$GLB,,,

## 2019-03-12 PROCEDURE — 99999 PR PBB SHADOW E&M-EST. PATIENT-LVL II: ICD-10-PCS | Mod: PBBFAC,,,

## 2019-03-12 PROCEDURE — 99999 PR PBB SHADOW E&M-EST. PATIENT-LVL II: CPT | Mod: PBBFAC,,,

## 2019-03-12 PROCEDURE — 99404 PREV MED CNSL INDIV APPRX 60: CPT | Mod: S$GLB,,,

## 2019-03-12 RX ORDER — IBUPROFEN 200 MG
1 TABLET ORAL DAILY
Qty: 28 PATCH | Refills: 0 | Status: SHIPPED | OUTPATIENT
Start: 2019-03-12 | End: 2019-12-05 | Stop reason: SDUPTHER

## 2019-03-12 RX ORDER — NICOTINE POLACRILEX 2 MG/1
2 LOZENGE ORAL
Qty: 81 EACH | Refills: 0 | Status: SHIPPED | OUTPATIENT
Start: 2019-03-12 | End: 2020-03-05 | Stop reason: SDUPTHER

## 2019-03-12 NOTE — Clinical Note
FTND of 6 indicates a high level of tobacco dependency. CESD of 3 is preceived as no mental distress or depression at this time.

## 2019-03-28 ENCOUNTER — CLINICAL SUPPORT (OUTPATIENT)
Dept: SMOKING CESSATION | Facility: CLINIC | Age: 59
End: 2019-03-28
Payer: COMMERCIAL

## 2019-03-28 DIAGNOSIS — F17.210 LIGHT CIGARETTE SMOKER (1-9 CIGARETTES PER DAY): Primary | ICD-10-CM

## 2019-03-28 PROCEDURE — 99999 PR PBB SHADOW E&M-EST. PATIENT-LVL I: CPT | Mod: PBBFAC,,,

## 2019-03-28 PROCEDURE — 99999 PR PBB SHADOW E&M-EST. PATIENT-LVL I: ICD-10-PCS | Mod: PBBFAC,,,

## 2019-03-28 PROCEDURE — 99407 BEHAV CHNG SMOKING > 10 MIN: CPT | Mod: S$GLB,,,

## 2019-03-28 PROCEDURE — 99407 PR TOBACCO USE CESSATION INTENSIVE >10 MINUTES: ICD-10-PCS | Mod: S$GLB,,,

## 2019-04-08 ENCOUNTER — CLINICAL SUPPORT (OUTPATIENT)
Dept: SMOKING CESSATION | Facility: CLINIC | Age: 59
End: 2019-04-08
Payer: COMMERCIAL

## 2019-04-08 DIAGNOSIS — F17.200 NICOTINE DEPENDENCE: Primary | ICD-10-CM

## 2019-04-08 PROCEDURE — 99407 BEHAV CHNG SMOKING > 10 MIN: CPT | Mod: S$GLB,,,

## 2019-04-08 PROCEDURE — 99407 PR TOBACCO USE CESSATION INTENSIVE >10 MINUTES: ICD-10-PCS | Mod: S$GLB,,,

## 2019-04-08 NOTE — PROGRESS NOTES
Called pt to f/u on her 6 month smoking cessation quit status. Pt stated she is still smoking, but came back to program for quit #2 on 3/12/19.  Informed her of benefit period, phone follow ups, and contact information. Will complete smart form and resolve quit #1 episode. Will call back in 3 months for quit #2 status.

## 2019-05-08 DIAGNOSIS — E11.65 TYPE 2 DIABETES MELLITUS WITH HYPERGLYCEMIA, WITHOUT LONG-TERM CURRENT USE OF INSULIN: ICD-10-CM

## 2019-05-08 RX ORDER — ATORVASTATIN CALCIUM 80 MG/1
80 TABLET, FILM COATED ORAL DAILY
Qty: 30 TABLET | Refills: 11 | Status: CANCELLED | OUTPATIENT
Start: 2019-05-08 | End: 2020-05-07

## 2019-05-08 RX ORDER — METFORMIN HYDROCHLORIDE 500 MG/1
1000 TABLET, EXTENDED RELEASE ORAL 2 TIMES DAILY WITH MEALS
Qty: 360 TABLET | Refills: 0 | Status: SHIPPED | OUTPATIENT
Start: 2019-05-08 | End: 2019-06-06 | Stop reason: SDUPTHER

## 2019-05-08 RX ORDER — CLOPIDOGREL BISULFATE 75 MG/1
75 TABLET ORAL DAILY
Qty: 30 TABLET | Refills: 11 | Status: CANCELLED | OUTPATIENT
Start: 2019-05-08 | End: 2020-05-07

## 2019-05-09 RX ORDER — ATORVASTATIN CALCIUM 80 MG/1
80 TABLET, FILM COATED ORAL DAILY
Qty: 30 TABLET | Refills: 11 | Status: CANCELLED | OUTPATIENT
Start: 2019-05-08 | End: 2020-05-07

## 2019-05-09 RX ORDER — CLOPIDOGREL BISULFATE 75 MG/1
75 TABLET ORAL DAILY
Qty: 30 TABLET | Refills: 11 | Status: CANCELLED | OUTPATIENT
Start: 2019-05-08 | End: 2020-05-07

## 2019-05-10 RX ORDER — CLOPIDOGREL BISULFATE 75 MG/1
75 TABLET ORAL DAILY
Qty: 30 TABLET | Refills: 11 | Status: SHIPPED | OUTPATIENT
Start: 2019-05-10 | End: 2019-08-02

## 2019-05-10 RX ORDER — ATORVASTATIN CALCIUM 80 MG/1
80 TABLET, FILM COATED ORAL DAILY
Qty: 30 TABLET | Refills: 11 | Status: SHIPPED | OUTPATIENT
Start: 2019-05-10 | End: 2019-06-06 | Stop reason: SDUPTHER

## 2019-05-31 ENCOUNTER — LAB VISIT (OUTPATIENT)
Dept: LAB | Facility: HOSPITAL | Age: 59
End: 2019-05-31
Attending: FAMILY MEDICINE
Payer: COMMERCIAL

## 2019-05-31 DIAGNOSIS — E11.65 TYPE 2 DIABETES MELLITUS WITH HYPERGLYCEMIA, WITHOUT LONG-TERM CURRENT USE OF INSULIN: ICD-10-CM

## 2019-05-31 LAB
ESTIMATED AVG GLUCOSE: 148 MG/DL (ref 68–131)
HBA1C MFR BLD HPLC: 6.8 % (ref 4–5.6)

## 2019-05-31 PROCEDURE — 36415 COLL VENOUS BLD VENIPUNCTURE: CPT | Mod: PO

## 2019-05-31 PROCEDURE — 83036 HEMOGLOBIN GLYCOSYLATED A1C: CPT

## 2019-06-06 ENCOUNTER — OFFICE VISIT (OUTPATIENT)
Dept: FAMILY MEDICINE | Facility: CLINIC | Age: 59
End: 2019-06-06
Payer: COMMERCIAL

## 2019-06-06 VITALS
HEIGHT: 65 IN | BODY MASS INDEX: 20.13 KG/M2 | SYSTOLIC BLOOD PRESSURE: 108 MMHG | OXYGEN SATURATION: 99 % | HEART RATE: 77 BPM | WEIGHT: 120.81 LBS | DIASTOLIC BLOOD PRESSURE: 62 MMHG

## 2019-06-06 DIAGNOSIS — E11.65 TYPE 2 DIABETES MELLITUS WITH HYPERGLYCEMIA, WITHOUT LONG-TERM CURRENT USE OF INSULIN: Primary | ICD-10-CM

## 2019-06-06 DIAGNOSIS — Z01.419 WELL WOMAN EXAM: ICD-10-CM

## 2019-06-06 DIAGNOSIS — L30.8 OTHER ECZEMA: ICD-10-CM

## 2019-06-06 DIAGNOSIS — E78.49 OTHER HYPERLIPIDEMIA: ICD-10-CM

## 2019-06-06 PROCEDURE — 3044F HG A1C LEVEL LT 7.0%: CPT | Mod: CPTII,S$GLB,, | Performed by: FAMILY MEDICINE

## 2019-06-06 PROCEDURE — 99999 PR PBB SHADOW E&M-EST. PATIENT-LVL III: CPT | Mod: PBBFAC,,, | Performed by: FAMILY MEDICINE

## 2019-06-06 PROCEDURE — 99214 PR OFFICE/OUTPT VISIT, EST, LEVL IV, 30-39 MIN: ICD-10-PCS | Mod: S$GLB,,, | Performed by: FAMILY MEDICINE

## 2019-06-06 PROCEDURE — 3008F PR BODY MASS INDEX (BMI) DOCUMENTED: ICD-10-PCS | Mod: CPTII,S$GLB,, | Performed by: FAMILY MEDICINE

## 2019-06-06 PROCEDURE — 3044F PR MOST RECENT HEMOGLOBIN A1C LEVEL <7.0%: ICD-10-PCS | Mod: CPTII,S$GLB,, | Performed by: FAMILY MEDICINE

## 2019-06-06 PROCEDURE — 3008F BODY MASS INDEX DOCD: CPT | Mod: CPTII,S$GLB,, | Performed by: FAMILY MEDICINE

## 2019-06-06 PROCEDURE — 99214 OFFICE O/P EST MOD 30 MIN: CPT | Mod: S$GLB,,, | Performed by: FAMILY MEDICINE

## 2019-06-06 PROCEDURE — 99999 PR PBB SHADOW E&M-EST. PATIENT-LVL III: ICD-10-PCS | Mod: PBBFAC,,, | Performed by: FAMILY MEDICINE

## 2019-06-06 RX ORDER — METFORMIN HYDROCHLORIDE 500 MG/1
1000 TABLET, EXTENDED RELEASE ORAL 2 TIMES DAILY WITH MEALS
Qty: 360 TABLET | Refills: 0 | Status: SHIPPED | OUTPATIENT
Start: 2019-06-06 | End: 2019-09-06 | Stop reason: SDUPTHER

## 2019-06-06 RX ORDER — TRIAMCINOLONE ACETONIDE 1 MG/G
CREAM TOPICAL 2 TIMES DAILY
Qty: 45 G | Refills: 0 | Status: SHIPPED | OUTPATIENT
Start: 2019-06-06

## 2019-06-06 RX ORDER — ATORVASTATIN CALCIUM 80 MG/1
80 TABLET, FILM COATED ORAL DAILY
Qty: 30 TABLET | Refills: 11 | Status: SHIPPED | OUTPATIENT
Start: 2019-06-06 | End: 2019-09-06 | Stop reason: SDUPTHER

## 2019-06-06 NOTE — PROGRESS NOTES
"Subjective:       Patient ID: Deanna Garcia is a 58 y.o. female.    Chief Complaint: Follow-up and Diabetes    Deanna is a 58 y.o. female who presents today for follow up on her chronic medical conditions.      DM: her a1c has decreased. Her metformin was increased to 1000 mg twice daily at the last visit. Her a1c is down to 6.8. She was at 9 one year ago! She drinks mostly water. Very rare soda.   DLD: no issues with the cholesterol medication. No muscle aches.   Tobacco Abuse: she is not smoking regularly. She is having 1 cigs/month.   Weight: stable.    She has a rash on her neck. This started one month ago. She tried neosporin. This helped and then returned. The rash itches. It is "dry" and "itchy." She also tried OTC hydrocortisone. No drainage.     Labs as below reviewed in detail.     Review of Systems   Constitutional: Negative for chills, fatigue and fever.   Respiratory: Negative for shortness of breath.    Cardiovascular: Negative for chest pain.   Gastrointestinal: Negative for constipation, diarrhea, nausea and vomiting.   Endocrine: Negative for polydipsia, polyphagia and polyuria.   Musculoskeletal: Negative for myalgias.   Skin: Positive for rash.   Neurological: Negative for dizziness, weakness, light-headedness and headaches.             Results for orders placed or performed in visit on 05/31/19   Hemoglobin A1c   Result Value Ref Range    Hemoglobin A1C 6.8 (H) 4.0 - 5.6 %    Estimated Avg Glucose 148 (H) 68 - 131 mg/dL       Objective:     Vitals:    06/06/19 0858   BP: 108/62   Pulse: 77        Physical Exam   Constitutional: She is oriented to person, place, and time. She appears well-developed and well-nourished.   HENT:   Head: Normocephalic and atraumatic.   Eyes: Conjunctivae are normal.   Neck: Normal range of motion. Neck supple.   Cardiovascular: Normal rate and regular rhythm.   Pulmonary/Chest: Effort normal and breath sounds normal.   Abdominal: Soft.   Musculoskeletal: She " exhibits no edema.   Feet:   Right Foot:   Protective Sensation: 10 sites tested. 10 sites sensed.   Skin Integrity: Negative for ulcer, blister, skin breakdown, erythema, warmth, callus or dry skin.   Left Foot:   Protective Sensation: 10 sites tested. 10 sites sensed.   Skin Integrity: Negative for ulcer, blister, skin breakdown, erythema, warmth, callus or dry skin.   Neurological: She is alert and oriented to person, place, and time. No sensory deficit.   Skin: Rash noted.   Flaking dry lesion noted on the patient's neck/upper back  There are excoriations noted along with thickened skin  No erythema  No drainage   Psychiatric: She has a normal mood and affect. Her behavior is normal. Judgment and thought content normal.   Nursing note and vitals reviewed.      Assessment:       1. Type 2 diabetes mellitus with hyperglycemia, without long-term current use of insulin    2. Other hyperlipidemia    3. BMI 20.0-20.9, adult    4. Other eczema    5. Well woman exam        Plan:       Continue metformin, 1000 mg twice daily  Continue statin  Continue aspirin and plavix  Continue BB  Steroid cream x 2 weeks. If not better contact me and I will refer to dermatology    Physical in 3 months. Labs before.       Type 2 diabetes mellitus with hyperglycemia, without long-term current use of insulin  -     metFORMIN (GLUCOPHAGE-XR) 500 MG 24 hr tablet; Take 2 tablets (1,000 mg total) by mouth 2 (two) times daily with meals.  Dispense: 360 tablet; Refill: 0  -     MICROALBUMIN / CREATININE RATIO URINE    Other hyperlipidemia  -     atorvastatin (LIPITOR) 80 MG tablet; Take 1 tablet (80 mg total) by mouth once daily.  Dispense: 30 tablet; Refill: 11    BMI 20.0-20.9, adult    Other eczema  -     triamcinolone acetonide 0.1% (KENALOG) 0.1 % cream; Apply topically 2 (two) times daily.  Dispense: 45 g; Refill: 0    Well woman exam  -     CBC auto differential; Future; Expected date: 06/06/2019  -     Comprehensive metabolic panel;  Future; Expected date: 06/06/2019  -     Hemoglobin A1c; Future; Expected date: 06/06/2019  -     Lipid panel; Future; Expected date: 06/06/2019  -     TSH; Future; Expected date: 06/06/2019  -     Vitamin D; Future; Expected date: 06/06/2019  -     MICROALBUMIN / CREATININE RATIO URINE        Warning signs discussed, patient to call with any further issues or worsening of symptoms.

## 2019-06-06 NOTE — PATIENT INSTRUCTIONS
Continue metformin, 1000 mg twice daily  Continue statin  Continue aspirin and plavix  Continue BB  Steroid cream x 2 weeks. If not better contact me and I will refer to dermatology    Physical in 3 months. Labs before.       Diabetes Management Status    Statin: Taking  ACE/ARB: Not taking    Screening or Prevention Patient's value Goal Complete/Controlled?   HgA1C Testing and Control   Lab Results   Component Value Date    HGBA1C 6.8 (H) 05/31/2019      Annually/Less than 8% Yes   Lipid profile : 09/12/2018 Annually Yes   LDL control Lab Results   Component Value Date    LDLCALC 59.6 (L) 09/12/2018    Annually/Less than 100 mg/dl  Yes   Nephropathy screening Lab Results   Component Value Date    LABMICR 8.0 06/13/2018     Lab Results   Component Value Date    PROTEINUA Negative 06/05/2018    Annually Yes   Blood pressure BP Readings from Last 1 Encounters:   06/06/19 108/62    Less than 140/90 Yes   Dilated retinal exam : 07/24/2018 Annually Yes   Foot exam   : 06/13/2018 Annually Yes

## 2019-06-21 RX ORDER — METOPROLOL SUCCINATE 25 MG/1
TABLET, EXTENDED RELEASE ORAL
Qty: 15 TABLET | Refills: 0 | Status: SHIPPED | OUTPATIENT
Start: 2019-06-21 | End: 2019-07-18 | Stop reason: SDUPTHER

## 2019-07-19 RX ORDER — METOPROLOL SUCCINATE 25 MG/1
TABLET, EXTENDED RELEASE ORAL
Qty: 15 TABLET | Refills: 0 | Status: SHIPPED | OUTPATIENT
Start: 2019-07-19 | End: 2019-09-06

## 2019-07-31 ENCOUNTER — PATIENT OUTREACH (OUTPATIENT)
Dept: ADMINISTRATIVE | Facility: OTHER | Age: 59
End: 2019-07-31

## 2019-07-31 DIAGNOSIS — E11.65 TYPE 2 DIABETES MELLITUS WITH HYPERGLYCEMIA, WITHOUT LONG-TERM CURRENT USE OF INSULIN: Primary | ICD-10-CM

## 2019-08-02 ENCOUNTER — OFFICE VISIT (OUTPATIENT)
Dept: CARDIOLOGY | Facility: CLINIC | Age: 59
End: 2019-08-02
Payer: COMMERCIAL

## 2019-08-02 VITALS
BODY MASS INDEX: 20.18 KG/M2 | DIASTOLIC BLOOD PRESSURE: 78 MMHG | WEIGHT: 121.25 LBS | SYSTOLIC BLOOD PRESSURE: 122 MMHG | OXYGEN SATURATION: 98 % | HEART RATE: 74 BPM

## 2019-08-02 DIAGNOSIS — I25.10 CORONARY ARTERY DISEASE INVOLVING NATIVE CORONARY ARTERY OF NATIVE HEART WITHOUT ANGINA PECTORIS: Primary | ICD-10-CM

## 2019-08-02 DIAGNOSIS — Z72.0 TOBACCO ABUSE: ICD-10-CM

## 2019-08-02 DIAGNOSIS — Z95.5 HISTORY OF CORONARY ARTERY STENT PLACEMENT: ICD-10-CM

## 2019-08-02 DIAGNOSIS — I21.4 NSTEMI (NON-ST ELEVATED MYOCARDIAL INFARCTION): ICD-10-CM

## 2019-08-02 DIAGNOSIS — I51.89 DIASTOLIC DYSFUNCTION: ICD-10-CM

## 2019-08-02 DIAGNOSIS — R00.1 BRADYCARDIA: ICD-10-CM

## 2019-08-02 DIAGNOSIS — E11.65 TYPE 2 DIABETES MELLITUS WITH HYPERGLYCEMIA, WITHOUT LONG-TERM CURRENT USE OF INSULIN: ICD-10-CM

## 2019-08-02 DIAGNOSIS — E78.49 OTHER HYPERLIPIDEMIA: ICD-10-CM

## 2019-08-02 PROCEDURE — 99999 PR PBB SHADOW E&M-EST. PATIENT-LVL III: ICD-10-PCS | Mod: PBBFAC,,, | Performed by: INTERNAL MEDICINE

## 2019-08-02 PROCEDURE — 99214 OFFICE O/P EST MOD 30 MIN: CPT | Mod: S$GLB,,, | Performed by: INTERNAL MEDICINE

## 2019-08-02 PROCEDURE — 3008F BODY MASS INDEX DOCD: CPT | Mod: CPTII,S$GLB,, | Performed by: INTERNAL MEDICINE

## 2019-08-02 PROCEDURE — 99999 PR PBB SHADOW E&M-EST. PATIENT-LVL III: CPT | Mod: PBBFAC,,, | Performed by: INTERNAL MEDICINE

## 2019-08-02 PROCEDURE — 3044F HG A1C LEVEL LT 7.0%: CPT | Mod: CPTII,S$GLB,, | Performed by: INTERNAL MEDICINE

## 2019-08-02 PROCEDURE — 99214 PR OFFICE/OUTPT VISIT, EST, LEVL IV, 30-39 MIN: ICD-10-PCS | Mod: S$GLB,,, | Performed by: INTERNAL MEDICINE

## 2019-08-02 PROCEDURE — 3044F PR MOST RECENT HEMOGLOBIN A1C LEVEL <7.0%: ICD-10-PCS | Mod: CPTII,S$GLB,, | Performed by: INTERNAL MEDICINE

## 2019-08-02 PROCEDURE — 3008F PR BODY MASS INDEX (BMI) DOCUMENTED: ICD-10-PCS | Mod: CPTII,S$GLB,, | Performed by: INTERNAL MEDICINE

## 2019-08-02 NOTE — PROGRESS NOTES
Subjective:    Patient ID:  Deanna Garcia is a 58 y.o. female who presents for follow-up of Coronary Artery Disease      HPI     59 y/o female with hx of CAD s/p NSTEMI 6/2018 with successful PCI to distal RCA with KATI, HLD, DM, diastolic dysfunction, tobacco abuse who presents for f/u. Originally seen for bradycardia and CP, diagnosed with NSTEMI, had PCI to distal RCA, 2DE with normal EF and no WMA's, started on good meds (BB held due to bradycardia) and discharged home with event monitor. Has done well and denies CP, SOB/MACEDO, orthopnea, PND, palps, syncope, LE edema. Compliant with and tolerating meds. Has cut back on smoking (down to 4 cigs/day), lost weight. States she is going to start in the gym this week.      Review of Systems   Constitution: Negative for malaise/fatigue.   HENT: Negative for congestion.    Eyes: Negative for blurred vision.   Cardiovascular: Negative for chest pain, claudication, cyanosis, dyspnea on exertion, irregular heartbeat, leg swelling, near-syncope, orthopnea, palpitations, paroxysmal nocturnal dyspnea and syncope.   Respiratory: Negative for shortness of breath.    Endocrine: Negative for polyuria.   Hematologic/Lymphatic: Negative for bleeding problem.   Skin: Negative for itching and rash.   Musculoskeletal: Negative for joint swelling, muscle cramps and muscle weakness.   Gastrointestinal: Negative for abdominal pain, hematemesis, hematochezia, melena, nausea and vomiting.   Genitourinary: Negative for dysuria and hematuria.   Neurological: Negative for dizziness, focal weakness, headaches, light-headedness, loss of balance and weakness.   Psychiatric/Behavioral: Negative for depression. The patient is not nervous/anxious.         Objective:    Physical Exam   Constitutional: She is oriented to person, place, and time. She appears well-developed and well-nourished.   HENT:   Head: Normocephalic and atraumatic.   Neck: Neck supple. No JVD present.   Cardiovascular: Normal  rate, regular rhythm and normal heart sounds.   Pulses:       Carotid pulses are 2+ on the right side, and 2+ on the left side.       Radial pulses are 2+ on the right side, and 2+ on the left side.        Femoral pulses are 2+ on the right side, and 2+ on the left side.       Dorsalis pedis pulses are 2+ on the right side, and 2+ on the left side.        Posterior tibial pulses are 2+ on the right side, and 2+ on the left side.   Pulmonary/Chest: Effort normal and breath sounds normal.   Abdominal: Soft. Bowel sounds are normal.   Musculoskeletal: She exhibits no edema.   Neurological: She is alert and oriented to person, place, and time.   Skin: Skin is warm and dry.   Psychiatric: She has a normal mood and affect. Her behavior is normal. Thought content normal.         Assessment:       1. Coronary artery disease involving native coronary artery of native heart without angina pectoris    2. Diastolic dysfunction    3. History of coronary artery stent placement    4. NSTEMI (non-ST elevated myocardial infarction)    5. Other hyperlipidemia    6. Bradycardia    7. BMI 20.0-20.9, adult    8. Type 2 diabetes mellitus with hyperglycemia, without long-term current use of insulin    9. Tobacco abuse      57 y/o pt with hx and presentation as above. Doing well from a cardiac perspective and compensated from a HF perspective. Greater than 1 year uninterrupted DAPT therapy and will D/C plavix. Discussed the etiology, evaluation, and management of CAD. Discussed the importance of med compliance, heart healthy diet, and regular exercise.        Plan:       -D/C plavix  -Smoking cessation  -Exercise  -f/u in 1 year

## 2019-09-04 ENCOUNTER — LAB VISIT (OUTPATIENT)
Dept: LAB | Facility: HOSPITAL | Age: 59
End: 2019-09-04
Attending: FAMILY MEDICINE
Payer: COMMERCIAL

## 2019-09-04 DIAGNOSIS — Z01.419 WELL WOMAN EXAM: ICD-10-CM

## 2019-09-04 LAB
25(OH)D3+25(OH)D2 SERPL-MCNC: 28 NG/ML (ref 30–96)
ALBUMIN SERPL BCP-MCNC: 4 G/DL (ref 3.5–5.2)
ALP SERPL-CCNC: 96 U/L (ref 55–135)
ALT SERPL W/O P-5'-P-CCNC: 16 U/L (ref 10–44)
ANION GAP SERPL CALC-SCNC: 11 MMOL/L (ref 8–16)
AST SERPL-CCNC: 18 U/L (ref 10–40)
BASOPHILS # BLD AUTO: 0.03 K/UL (ref 0–0.2)
BASOPHILS NFR BLD: 0.4 % (ref 0–1.9)
BILIRUB SERPL-MCNC: 0.4 MG/DL (ref 0.1–1)
BUN SERPL-MCNC: 17 MG/DL (ref 6–20)
CALCIUM SERPL-MCNC: 9.5 MG/DL (ref 8.7–10.5)
CHLORIDE SERPL-SCNC: 103 MMOL/L (ref 95–110)
CHOLEST SERPL-MCNC: 117 MG/DL (ref 120–199)
CHOLEST/HDLC SERPL: 2.4 {RATIO} (ref 2–5)
CO2 SERPL-SCNC: 25 MMOL/L (ref 23–29)
CREAT SERPL-MCNC: 0.7 MG/DL (ref 0.5–1.4)
DIFFERENTIAL METHOD: ABNORMAL
EOSINOPHIL # BLD AUTO: 0.1 K/UL (ref 0–0.5)
EOSINOPHIL NFR BLD: 1.6 % (ref 0–8)
ERYTHROCYTE [DISTWIDTH] IN BLOOD BY AUTOMATED COUNT: 12.4 % (ref 11.5–14.5)
EST. GFR  (AFRICAN AMERICAN): >60 ML/MIN/1.73 M^2
EST. GFR  (NON AFRICAN AMERICAN): >60 ML/MIN/1.73 M^2
ESTIMATED AVG GLUCOSE: 148 MG/DL (ref 68–131)
GLUCOSE SERPL-MCNC: 140 MG/DL (ref 70–110)
HBA1C MFR BLD HPLC: 6.8 % (ref 4–5.6)
HCT VFR BLD AUTO: 43 % (ref 37–48.5)
HDLC SERPL-MCNC: 48 MG/DL (ref 40–75)
HDLC SERPL: 41 % (ref 20–50)
HGB BLD-MCNC: 14.1 G/DL (ref 12–16)
IMM GRANULOCYTES # BLD AUTO: 0.02 K/UL (ref 0–0.04)
IMM GRANULOCYTES NFR BLD AUTO: 0.2 % (ref 0–0.5)
LDLC SERPL CALC-MCNC: 60.4 MG/DL (ref 63–159)
LYMPHOCYTES # BLD AUTO: 1.9 K/UL (ref 1–4.8)
LYMPHOCYTES NFR BLD: 22.5 % (ref 18–48)
MCH RBC QN AUTO: 32.5 PG (ref 27–31)
MCHC RBC AUTO-ENTMCNC: 32.8 G/DL (ref 32–36)
MCV RBC AUTO: 99 FL (ref 82–98)
MONOCYTES # BLD AUTO: 0.5 K/UL (ref 0.3–1)
MONOCYTES NFR BLD: 6.5 % (ref 4–15)
NEUTROPHILS # BLD AUTO: 5.8 K/UL (ref 1.8–7.7)
NEUTROPHILS NFR BLD: 68.8 % (ref 38–73)
NONHDLC SERPL-MCNC: 69 MG/DL
NRBC BLD-RTO: 0 /100 WBC
PLATELET # BLD AUTO: 276 K/UL (ref 150–350)
PMV BLD AUTO: 10.2 FL (ref 9.2–12.9)
POTASSIUM SERPL-SCNC: 3.9 MMOL/L (ref 3.5–5.1)
PROT SERPL-MCNC: 7 G/DL (ref 6–8.4)
RBC # BLD AUTO: 4.34 M/UL (ref 4–5.4)
SODIUM SERPL-SCNC: 139 MMOL/L (ref 136–145)
TRIGL SERPL-MCNC: 43 MG/DL (ref 30–150)
TSH SERPL DL<=0.005 MIU/L-ACNC: 2.25 UIU/ML (ref 0.4–4)
WBC # BLD AUTO: 8.37 K/UL (ref 3.9–12.7)

## 2019-09-04 PROCEDURE — 80053 COMPREHEN METABOLIC PANEL: CPT

## 2019-09-04 PROCEDURE — 84443 ASSAY THYROID STIM HORMONE: CPT

## 2019-09-04 PROCEDURE — 80061 LIPID PANEL: CPT

## 2019-09-04 PROCEDURE — 36415 COLL VENOUS BLD VENIPUNCTURE: CPT | Mod: PO

## 2019-09-04 PROCEDURE — 82306 VITAMIN D 25 HYDROXY: CPT

## 2019-09-04 PROCEDURE — 83036 HEMOGLOBIN GLYCOSYLATED A1C: CPT

## 2019-09-04 PROCEDURE — 85025 COMPLETE CBC W/AUTO DIFF WBC: CPT

## 2019-09-06 ENCOUNTER — OFFICE VISIT (OUTPATIENT)
Dept: FAMILY MEDICINE | Facility: CLINIC | Age: 59
End: 2019-09-06
Payer: COMMERCIAL

## 2019-09-06 VITALS
WEIGHT: 120.56 LBS | HEIGHT: 65 IN | DIASTOLIC BLOOD PRESSURE: 58 MMHG | HEART RATE: 76 BPM | SYSTOLIC BLOOD PRESSURE: 122 MMHG | OXYGEN SATURATION: 98 % | BODY MASS INDEX: 20.09 KG/M2

## 2019-09-06 DIAGNOSIS — I25.10 CORONARY ARTERY DISEASE INVOLVING NATIVE CORONARY ARTERY OF NATIVE HEART WITHOUT ANGINA PECTORIS: ICD-10-CM

## 2019-09-06 DIAGNOSIS — E11.65 TYPE 2 DIABETES MELLITUS WITH HYPERGLYCEMIA, WITHOUT LONG-TERM CURRENT USE OF INSULIN: ICD-10-CM

## 2019-09-06 DIAGNOSIS — Z01.419 WELL WOMAN EXAM: Primary | ICD-10-CM

## 2019-09-06 DIAGNOSIS — Z23 NEED FOR INFLUENZA VACCINATION: ICD-10-CM

## 2019-09-06 DIAGNOSIS — R79.89 LOW VITAMIN D LEVEL: ICD-10-CM

## 2019-09-06 DIAGNOSIS — Z95.5 HISTORY OF CORONARY ARTERY STENT PLACEMENT: ICD-10-CM

## 2019-09-06 DIAGNOSIS — Z72.0 TOBACCO ABUSE: ICD-10-CM

## 2019-09-06 DIAGNOSIS — E78.49 OTHER HYPERLIPIDEMIA: ICD-10-CM

## 2019-09-06 PROBLEM — R07.9 CHEST PAIN: Status: RESOLVED | Noted: 2018-06-05 | Resolved: 2019-09-06

## 2019-09-06 PROCEDURE — 99396 PR PREVENTIVE VISIT,EST,40-64: ICD-10-PCS | Mod: 25,S$GLB,, | Performed by: FAMILY MEDICINE

## 2019-09-06 PROCEDURE — 99396 PREV VISIT EST AGE 40-64: CPT | Mod: 25,S$GLB,, | Performed by: FAMILY MEDICINE

## 2019-09-06 PROCEDURE — 3044F PR MOST RECENT HEMOGLOBIN A1C LEVEL <7.0%: ICD-10-PCS | Mod: CPTII,S$GLB,, | Performed by: FAMILY MEDICINE

## 2019-09-06 PROCEDURE — 90686 FLU VACCINE (QUAD) GREATER THAN OR EQUAL TO 3YO PRESERVATIVE FREE IM: ICD-10-PCS | Mod: S$GLB,,, | Performed by: FAMILY MEDICINE

## 2019-09-06 PROCEDURE — 99999 PR PBB SHADOW E&M-EST. PATIENT-LVL IV: ICD-10-PCS | Mod: PBBFAC,,, | Performed by: FAMILY MEDICINE

## 2019-09-06 PROCEDURE — 90471 IMMUNIZATION ADMIN: CPT | Mod: S$GLB,,, | Performed by: FAMILY MEDICINE

## 2019-09-06 PROCEDURE — 90686 IIV4 VACC NO PRSV 0.5 ML IM: CPT | Mod: S$GLB,,, | Performed by: FAMILY MEDICINE

## 2019-09-06 PROCEDURE — 3044F HG A1C LEVEL LT 7.0%: CPT | Mod: CPTII,S$GLB,, | Performed by: FAMILY MEDICINE

## 2019-09-06 PROCEDURE — 90471 FLU VACCINE (QUAD) GREATER THAN OR EQUAL TO 3YO PRESERVATIVE FREE IM: ICD-10-PCS | Mod: S$GLB,,, | Performed by: FAMILY MEDICINE

## 2019-09-06 PROCEDURE — 99999 PR PBB SHADOW E&M-EST. PATIENT-LVL IV: CPT | Mod: PBBFAC,,, | Performed by: FAMILY MEDICINE

## 2019-09-06 RX ORDER — ATORVASTATIN CALCIUM 80 MG/1
80 TABLET, FILM COATED ORAL DAILY
Qty: 90 TABLET | Refills: 3 | Status: SHIPPED | OUTPATIENT
Start: 2019-09-06 | End: 2019-12-05 | Stop reason: SDUPTHER

## 2019-09-06 RX ORDER — ERGOCALCIFEROL 1.25 MG/1
50000 CAPSULE ORAL
Qty: 12 CAPSULE | Refills: 0 | Status: SHIPPED | OUTPATIENT
Start: 2019-09-06 | End: 2019-10-31

## 2019-09-06 RX ORDER — METFORMIN HYDROCHLORIDE 500 MG/1
1000 TABLET, EXTENDED RELEASE ORAL 2 TIMES DAILY WITH MEALS
Qty: 360 TABLET | Refills: 0 | Status: SHIPPED | OUTPATIENT
Start: 2019-09-06 | End: 2019-12-05 | Stop reason: SDUPTHER

## 2019-09-06 NOTE — PATIENT INSTRUCTIONS
You have low vitamin D and a Rx has been sent to your pharmacy. Take this pill once a week and when the prescription and refills are done, start taking an OTC vitamin D supplementation at 1000 IU daily.     Continue metformin 1000 mg twice daily  Continue atorvastatin 80 mg  Continue not taking plavix  STOP SMOKING AND DRINKING SODA!    Diabetes Management Status    Statin: Taking  ACE/ARB: Not taking    Screening or Prevention Patient's value Goal Complete/Controlled?   HgA1C Testing and Control   Lab Results   Component Value Date    HGBA1C 6.8 (H) 09/04/2019      Annually/Less than 8% Yes   Lipid profile : 09/04/2019 Annually Yes   LDL control Lab Results   Component Value Date    LDLCALC 60.4 (L) 09/04/2019    Annually/Less than 100 mg/dl  Yes   Nephropathy screening Lab Results   Component Value Date    LABMICR 9.0 09/04/2019     Lab Results   Component Value Date    PROTEINUA Negative 06/05/2018    Annually Yes   Blood pressure BP Readings from Last 1 Encounters:   09/06/19 (!) 122/58    Less than 140/90 Yes   Dilated retinal exam : 07/24/2018 Annually Yes   Foot exam   : 06/06/2019 Annually Yes

## 2019-09-06 NOTE — PROGRESS NOTES
"Subjective:       Patient ID: Deanna Garcia is a 59 y.o. female.    Chief Complaint: Annual Exam    Deanna Garcia is a 59 y.o. female who presents today for a physical.     Diet: 1-2 cokes/week. Drinking a lot of water. She can't drink a "whole can of soda because it is so sweet." She is eating a lot of chicken lately. No pork. She is eating a lot of salads; zucchini, squash, eggplant, broccoli. She is eating rice about 2-3 times per week.   Exercise: she goes to the gym 3x/week. She walks. She uses kettle bells, lifting some weights.     A1c: stable. <7  HTN: stable, off BB due to bradycardia  DLD: on statin    Flu: ordered   Tdap: UTD  Labs: ordered previously, reviewed today.     C-scope: UTD with FIT Kit    Mammogram: Had long discussion regarding risks and benefits of screening mammography. Explained that they can be started at 40 or 50 and repeated every 1 or 2 years. Patient desires every 2 years.   Pap: s/p hysterectomy     PMHx: reviewed in EMR and updated  Meds: reviewed in EMR and updated  Shx: reviewed in EMR and updated  FMHx: no family history of colon cancer, breast cancer, ovarian cancer  Social: she works for a [employer] service. Her daughter moved to [place]. There are no pets at home. No family nearby here.       Review of Systems   Constitutional: Negative for chills, fatigue and fever.   Respiratory: Negative for shortness of breath.    Cardiovascular: Negative for chest pain.   Gastrointestinal: Negative for constipation, diarrhea, nausea and vomiting.   Endocrine: Negative for polydipsia, polyphagia and polyuria.   Musculoskeletal: Negative for myalgias.   Skin: Negative for rash.   Neurological: Negative for dizziness, weakness, light-headedness and headaches.         Health Maintenance Due   Topic Date Due    Eye Exam  07/24/2019     Immunization History   Administered Date(s) Administered    Hepatitis B, Pediatric/Adolescent 03/02/2016    Influenza 03/02/2016    Influenza - " Quadrivalent - PF (6 months and older) 09/14/2018, 09/06/2019    MMR 03/10/2016    Pneumococcal Polysaccharide - 23 Valent 12/17/2018    Tdap 03/02/2016         Objective:     Vitals:    09/06/19 0751   BP: (!) 122/58   Pulse: 76        Physical Exam   Constitutional: She is oriented to person, place, and time. She appears well-developed and well-nourished.   HENT:   Head: Normocephalic and atraumatic.   Right Ear: Tympanic membrane, external ear and ear canal normal.   Left Ear: Tympanic membrane, external ear and ear canal normal.   Nose: Nose normal.   Mouth/Throat: Oropharynx is clear and moist and mucous membranes are normal.   Eyes: Pupils are equal, round, and reactive to light. Conjunctivae are normal.   Neck: Normal range of motion.   Cardiovascular: Normal rate, regular rhythm and normal heart sounds.   Pulmonary/Chest: Effort normal and breath sounds normal. No respiratory distress.   Abdominal: Soft. Bowel sounds are normal. She exhibits no distension.   Genitourinary:   Genitourinary Comments: deferred    Musculoskeletal: She exhibits no edema.   Neurological: She is alert and oriented to person, place, and time. No cranial nerve deficit or sensory deficit. She exhibits normal muscle tone.   Skin: Skin is warm and dry.   Psychiatric: She has a normal mood and affect. Her speech is normal and behavior is normal. Judgment and thought content normal.   Nursing note and vitals reviewed.      Assessment:       1. Well woman exam    2. Coronary artery disease involving native coronary artery of native heart without angina pectoris    3. Other hyperlipidemia    4. History of coronary artery stent placement    5. Type 2 diabetes mellitus with hyperglycemia, without long-term current use of insulin    6. BMI 20.0-20.9, adult    7. Tobacco abuse    8. Need for influenza vaccination    9. Low vitamin D level        Plan:       Continue metformin 1000 mg twice daily  Continue atorvastatin 80 mg  Continue not  taking plavix  STOP SMOKING AND DRINKING SODA!    You have low vitamin D and a Rx has been sent to your pharmacy. Take this pill once a week and when the prescription and refills are done, start taking an OTC vitamin D supplementation at 1000 IU daily.       Well woman exam  ?Avoid tobacco  ?Be physically active  ?Maintain a healthy weight  ?Eat a diet rich in fruits, vegetables, and whole grains, and low in saturated/trans fat  ?Limit alcohol consumption  ?Protect against sexually transmitted infections  ?Avoid excess sun    Coronary artery disease involving native coronary artery of native heart without angina pectoris    Other hyperlipidemia  -     atorvastatin (LIPITOR) 80 MG tablet; Take 1 tablet (80 mg total) by mouth once daily.  Dispense: 90 tablet; Refill: 3    History of coronary artery stent placement  -     atorvastatin (LIPITOR) 80 MG tablet; Take 1 tablet (80 mg total) by mouth once daily.  Dispense: 90 tablet; Refill: 3    Type 2 diabetes mellitus with hyperglycemia, without long-term current use of insulin  -     Ambulatory referral to Optometry  -     metFORMIN (GLUCOPHAGE-XR) 500 MG 24 hr tablet; Take 2 tablets (1,000 mg total) by mouth 2 (two) times daily with meals.  Dispense: 360 tablet; Refill: 0  -     Hemoglobin A1c; Future; Expected date: 09/06/2019    BMI 20.0-20.9, adult    Tobacco abuse    Need for influenza vaccination  -     Influenza - Quadrivalent (3 years & older) (PF)    Low vitamin D level  -     ergocalciferol (ERGOCALCIFEROL) 50,000 unit Cap; Take 1 capsule (50,000 Units total) by mouth every 7 days. Then start daily OTC replacement after this Rx is complete  Dispense: 12 capsule; Refill: 0      Warning signs discussed, patient to call with any further issues or worsening of symptoms.

## 2019-10-09 ENCOUNTER — PATIENT OUTREACH (OUTPATIENT)
Dept: ADMINISTRATIVE | Facility: OTHER | Age: 59
End: 2019-10-09

## 2019-10-14 ENCOUNTER — OFFICE VISIT (OUTPATIENT)
Dept: OPTOMETRY | Facility: CLINIC | Age: 59
End: 2019-10-14
Payer: COMMERCIAL

## 2019-10-14 DIAGNOSIS — H52.203 ASTIGMATISM OF BOTH EYES WITH PRESBYOPIA: ICD-10-CM

## 2019-10-14 DIAGNOSIS — E11.9 TYPE 2 DIABETES MELLITUS WITHOUT RETINOPATHY: Primary | ICD-10-CM

## 2019-10-14 DIAGNOSIS — H25.13 NUCLEAR SCLEROSIS, BILATERAL: ICD-10-CM

## 2019-10-14 DIAGNOSIS — H52.4 ASTIGMATISM OF BOTH EYES WITH PRESBYOPIA: ICD-10-CM

## 2019-10-14 PROCEDURE — 99999 PR PBB SHADOW E&M-EST. PATIENT-LVL II: CPT | Mod: PBBFAC,,, | Performed by: OPTOMETRIST

## 2019-10-14 PROCEDURE — 92015 DETERMINE REFRACTIVE STATE: CPT | Mod: S$GLB,,, | Performed by: OPTOMETRIST

## 2019-10-14 PROCEDURE — 92015 PR REFRACTION: ICD-10-PCS | Mod: S$GLB,,, | Performed by: OPTOMETRIST

## 2019-10-14 PROCEDURE — 99999 PR PBB SHADOW E&M-EST. PATIENT-LVL II: ICD-10-PCS | Mod: PBBFAC,,, | Performed by: OPTOMETRIST

## 2019-10-14 PROCEDURE — 92014 COMPRE OPH EXAM EST PT 1/>: CPT | Mod: S$GLB,,, | Performed by: OPTOMETRIST

## 2019-10-14 PROCEDURE — 92014 PR EYE EXAM, EST PATIENT,COMPREHESV: ICD-10-PCS | Mod: S$GLB,,, | Performed by: OPTOMETRIST

## 2019-10-14 NOTE — LETTER
October 14, 2019      Geoff Garcia, DO  [address] [place] [address]           [place] [facility]  [address]  [place] [address]  Phone: [phone]  Fax: [phone]          Patient: Deanna Garcia   MR Number: [id number]   YOB: 1960   Date of Visit: 10/14/2019       Dear Dr. Geoff Garcia:    Thank you for referring Deanna Garcia to me for evaluation. Attached you will find relevant portions of my assessment and plan of care.    If you have questions, please do not hesitate to call me. I look forward to following Deanna Garcia along with you.    Sincerely,    Jose Huston, OD    Enclosure  CC:  No Recipients    If you would like to receive this communication electronically, please contact [email or web address] or [phone] to request more information on [employer] access.    For providers and/or their staff who would like to refer a patient to [facility], please contact us through our one-stop-shop provider referral line, [facility], at [phone].    If you feel you have received this communication in error or would no longer like to receive these types of communications, please e-mail [email or web address]

## 2019-10-14 NOTE — PROGRESS NOTES
HPI     DLS: 7/24/18  Pt states she is finding she's is needing her glasses more then she used   to, has some dry eyes.   Got new spex last year  No f/f  AT gtts   LBS: didn't cheak today   Hemoglobin A1C       Date                     Value               Ref Range             Status                09/04/2019               6.8 (H)             4.0 - 5.6 %           Final                     05/31/2019               6.8 (H)             4.0 - 5.6 %           Final                      02/28/2019               7.4 (H)             4.0 - 5.6 %           Final                    Last edited by Jose Huston, OD on 10/14/2019  7:47 AM. (History)        ROS     Positive for: Endocrine (DM)    Negative for: Constitutional, Gastrointestinal, Neurological, Skin,   Genitourinary, Musculoskeletal, HENT, Cardiovascular, Eyes, Respiratory,   Psychiatric, Allergic/Imm, Heme/Lymph    Last edited by Jose Huston, OD on 10/14/2019  7:47 AM. (History)        Assessment /Plan     For exam results, see Encounter Report.    Type 2 diabetes mellitus without retinopathy    Astigmatism of both eyes with presbyopia    Nuclear sclerosis, bilateral      1. Small Cats  2. DM- WITHOUT RETINOPATHY.  Advised yearly DFE  3. Intermediate complaints--wearing lined bifocals-- Wrote spex Rx.  Discussed PALs/trifocals to see computer better    PLAN:    rtc 1 yr

## 2019-10-15 ENCOUNTER — CLINICAL SUPPORT (OUTPATIENT)
Dept: SMOKING CESSATION | Facility: CLINIC | Age: 59
End: 2019-10-15
Payer: COMMERCIAL

## 2019-10-15 DIAGNOSIS — F17.200 NICOTINE DEPENDENCE: Primary | ICD-10-CM

## 2019-10-15 PROCEDURE — 99406 PR TOBACCO USE CESSATION INTERMEDIATE 3-10 MINUTES: ICD-10-PCS | Mod: S$GLB,,, | Performed by: INTERNAL MEDICINE

## 2019-10-15 PROCEDURE — 99406 BEHAV CHNG SMOKING 3-10 MIN: CPT | Mod: S$GLB,,, | Performed by: INTERNAL MEDICINE

## 2019-10-15 NOTE — PROGRESS NOTES
Spoke with patient today in regard to smoking cessation progress for 3/6 month telephone follow up, she states tobacco free. Commended patient on the accomplishment. Informed patient of benefit period, future follow up, and contact information if any further help or support is needed. Will complete smart form for 3 and 6 month follow up on Quit attempt #2.

## 2019-10-25 NOTE — ASSESSMENT & PLAN NOTE
-presented with complaints of chest pain concerning for ischemic etiology  -initial troponin .122 with trend up to .780-1.787 overnight and 8.607 this AM  -EKG with no acute changes  -loaded with ASA and Plavix and placed on daily ASA and Plavix along with Lipitor  -no BB due to bradycardia upon admission; no ACEI/ARB due to marginal BP overnight  -NPO for LHC today  -echo with normal LVEF; will hold off on Heparin and Lovenox for now due to plans for LHC today    <<----- Click to add NO pertinent Family History

## 2019-10-31 DIAGNOSIS — R79.89 LOW VITAMIN D LEVEL: ICD-10-CM

## 2019-10-31 RX ORDER — ERGOCALCIFEROL 1.25 MG/1
50000 CAPSULE ORAL
Qty: 12 CAPSULE | Refills: 0 | Status: SHIPPED | OUTPATIENT
Start: 2019-10-31 | End: 2020-03-05 | Stop reason: SDUPTHER

## 2019-11-19 ENCOUNTER — TELEPHONE (OUTPATIENT)
Dept: FAMILY MEDICINE | Facility: CLINIC | Age: 59
End: 2019-11-19

## 2019-12-04 ENCOUNTER — LAB VISIT (OUTPATIENT)
Dept: LAB | Facility: HOSPITAL | Age: 59
End: 2019-12-04
Attending: FAMILY MEDICINE
Payer: COMMERCIAL

## 2019-12-04 DIAGNOSIS — E11.65 TYPE 2 DIABETES MELLITUS WITH HYPERGLYCEMIA, WITHOUT LONG-TERM CURRENT USE OF INSULIN: ICD-10-CM

## 2019-12-04 LAB
ESTIMATED AVG GLUCOSE: 146 MG/DL (ref 68–131)
HBA1C MFR BLD HPLC: 6.7 % (ref 4–5.6)

## 2019-12-04 PROCEDURE — 83036 HEMOGLOBIN GLYCOSYLATED A1C: CPT

## 2019-12-04 PROCEDURE — 36415 COLL VENOUS BLD VENIPUNCTURE: CPT | Mod: PO

## 2019-12-05 ENCOUNTER — OFFICE VISIT (OUTPATIENT)
Dept: FAMILY MEDICINE | Facility: CLINIC | Age: 59
End: 2019-12-05
Payer: COMMERCIAL

## 2019-12-05 VITALS
OXYGEN SATURATION: 98 % | HEART RATE: 76 BPM | TEMPERATURE: 98 F | WEIGHT: 117.5 LBS | DIASTOLIC BLOOD PRESSURE: 72 MMHG | BODY MASS INDEX: 19.55 KG/M2 | SYSTOLIC BLOOD PRESSURE: 118 MMHG

## 2019-12-05 DIAGNOSIS — Z72.0 TOBACCO ABUSE: ICD-10-CM

## 2019-12-05 DIAGNOSIS — Z95.5 HISTORY OF CORONARY ARTERY STENT PLACEMENT: ICD-10-CM

## 2019-12-05 DIAGNOSIS — E78.49 OTHER HYPERLIPIDEMIA: ICD-10-CM

## 2019-12-05 DIAGNOSIS — F17.210 LIGHT CIGARETTE SMOKER (1-9 CIGARETTES PER DAY): ICD-10-CM

## 2019-12-05 DIAGNOSIS — E11.65 TYPE 2 DIABETES MELLITUS WITH HYPERGLYCEMIA, WITHOUT LONG-TERM CURRENT USE OF INSULIN: Primary | ICD-10-CM

## 2019-12-05 PROCEDURE — 3008F BODY MASS INDEX DOCD: CPT | Mod: CPTII,S$GLB,, | Performed by: FAMILY MEDICINE

## 2019-12-05 PROCEDURE — 3044F HG A1C LEVEL LT 7.0%: CPT | Mod: CPTII,S$GLB,, | Performed by: FAMILY MEDICINE

## 2019-12-05 PROCEDURE — 3044F PR MOST RECENT HEMOGLOBIN A1C LEVEL <7.0%: ICD-10-PCS | Mod: CPTII,S$GLB,, | Performed by: FAMILY MEDICINE

## 2019-12-05 PROCEDURE — 99214 OFFICE O/P EST MOD 30 MIN: CPT | Mod: S$GLB,,, | Performed by: FAMILY MEDICINE

## 2019-12-05 PROCEDURE — 99999 PR PBB SHADOW E&M-EST. PATIENT-LVL III: ICD-10-PCS | Mod: PBBFAC,,, | Performed by: FAMILY MEDICINE

## 2019-12-05 PROCEDURE — 99999 PR PBB SHADOW E&M-EST. PATIENT-LVL III: CPT | Mod: PBBFAC,,, | Performed by: FAMILY MEDICINE

## 2019-12-05 PROCEDURE — 99214 PR OFFICE/OUTPT VISIT, EST, LEVL IV, 30-39 MIN: ICD-10-PCS | Mod: S$GLB,,, | Performed by: FAMILY MEDICINE

## 2019-12-05 PROCEDURE — 3008F PR BODY MASS INDEX (BMI) DOCUMENTED: ICD-10-PCS | Mod: CPTII,S$GLB,, | Performed by: FAMILY MEDICINE

## 2019-12-05 RX ORDER — IBUPROFEN 200 MG
1 TABLET ORAL DAILY
Qty: 28 PATCH | Refills: 0 | Status: SHIPPED | OUTPATIENT
Start: 2019-12-05 | End: 2020-03-05 | Stop reason: SDUPTHER

## 2019-12-05 RX ORDER — ATORVASTATIN CALCIUM 80 MG/1
80 TABLET, FILM COATED ORAL DAILY
Qty: 90 TABLET | Refills: 3 | Status: SHIPPED | OUTPATIENT
Start: 2019-12-05 | End: 2020-03-05 | Stop reason: SDUPTHER

## 2019-12-05 RX ORDER — METFORMIN HYDROCHLORIDE 500 MG/1
1000 TABLET, EXTENDED RELEASE ORAL 2 TIMES DAILY WITH MEALS
Qty: 360 TABLET | Refills: 0 | Status: SHIPPED | OUTPATIENT
Start: 2019-12-05 | End: 2020-03-05 | Stop reason: SDUPTHER

## 2019-12-05 NOTE — PROGRESS NOTES
Subjective:       Patient ID: Deanna Garcia is a 59 y.o. female.    Chief Complaint: No chief complaint on file.    Deanna is a 59 y.o. female who presents today for follow up on her multiple medical issues    A1c: stable. <7. Down from 6.8 to 6.7.   HTN: stable, off BB due to bradycardia  DLD: on statin  Smoking: smoking 5 cigs/week! She was down to zero, has increased.     Labs as below reviewed in detail.     Review of Systems   Constitutional: Negative for chills, fatigue and fever.   Respiratory: Negative for shortness of breath.    Cardiovascular: Negative for chest pain.   Gastrointestinal: Negative for constipation, diarrhea, nausea and vomiting.   Endocrine: Negative for polydipsia, polyphagia and polyuria.   Musculoskeletal: Negative for myalgias.   Skin: Negative for rash.   Neurological: Negative for dizziness, weakness, light-headedness and headaches.         Results for orders placed or performed in visit on 12/04/19   Hemoglobin A1c   Result Value Ref Range    Hemoglobin A1C 6.7 (H) 4.0 - 5.6 %    Estimated Avg Glucose 146 (H) 68 - 131 mg/dL       Objective:     Vitals:    12/05/19 0818   BP: 118/72   Pulse: 76   Temp: 98.4 °F (36.9 °C)        Physical Exam   Constitutional: She is oriented to person, place, and time. She appears well-developed and well-nourished. No distress.   HENT:   Head: Normocephalic and atraumatic.   Eyes: Conjunctivae are normal.   Cardiovascular: Normal rate and regular rhythm.   Pulmonary/Chest: Effort normal and breath sounds normal.   Abdominal: Soft. There is no tenderness.   Musculoskeletal: She exhibits no edema.   Neurological: She is alert and oriented to person, place, and time.   Skin: Skin is warm. She is not diaphoretic.   Psychiatric: She has a normal mood and affect. Her behavior is normal. Judgment and thought content normal.   Nursing note and vitals reviewed.      Assessment:       1. Type 2 diabetes mellitus with hyperglycemia, without long-term current  use of insulin    2. History of coronary artery stent placement    3. Other hyperlipidemia    4. BMI 20.0-20.9, adult    5. Tobacco abuse    6. Light cigarette smoker (1-9 cigarettes per day)        Plan:       Continue metformin 1000 mg twice daily  Continue atorvastatin 80 mg  Continue not taking plavix  STOP SMOKING AND DRINKING SODA!    F/u in 3 months. Patient moving to [place]. Can try to find a new PCP there or see me q6 months.     Will discuss again at next visit.       Type 2 diabetes mellitus with hyperglycemia, without long-term current use of insulin  -     metFORMIN (GLUCOPHAGE-XR) 500 MG 24 hr tablet; Take 2 tablets (1,000 mg total) by mouth 2 (two) times daily with meals.  Dispense: 360 tablet; Refill: 0  -     Hemoglobin A1c; Future; Expected date: 12/05/2019    History of coronary artery stent placement  -     atorvastatin (LIPITOR) 80 MG tablet; Take 1 tablet (80 mg total) by mouth once daily.  Dispense: 90 tablet; Refill: 3    Other hyperlipidemia  -     atorvastatin (LIPITOR) 80 MG tablet; Take 1 tablet (80 mg total) by mouth once daily.  Dispense: 90 tablet; Refill: 3    BMI 20.0-20.9, adult    Tobacco abuse  -     nicotine (NICODERM CQ) 21 mg/24 hr; Place 1 patch onto the skin once daily.  Dispense: 28 patch; Refill: 0    Light cigarette smoker (1-9 cigarettes per day)  -     nicotine (NICODERM CQ) 21 mg/24 hr; Place 1 patch onto the skin once daily.  Dispense: 28 patch; Refill: 0        Warning signs discussed, patient to call with any further issues or worsening of symptoms.

## 2019-12-05 NOTE — PATIENT INSTRUCTIONS
Continue metformin 1000 mg twice daily  Continue atorvastatin 80 mg  Continue not taking plavix  STOP SMOKING AND DRINKING SODA!     You have low vitamin D and a Rx has been sent to your pharmacy. Take this pill once a week and when the prescription and refills are done, start taking an OTC vitamin D supplementation at 1000 IU daily.     Diabetes health maintenance:  -- advise regular and consistent glucose monitoring and medication compliance.  -- advise daily foot checks  -- advise yearly ophthalmologic exams  -- advise adequate dietary and exercise modification  -- advise regular dental visits  -- Medication management    Diabetes Management Status    Statin: Taking  ACE/ARB: Not taking    Screening or Prevention Patient's value Goal Complete/Controlled?   HgA1C Testing and Control   Lab Results   Component Value Date    HGBA1C 6.7 (H) 12/04/2019      Annually/Less than 8% Yes   Lipid profile : 09/04/2019 Annually Yes   LDL control Lab Results   Component Value Date    LDLCALC 60.4 (L) 09/04/2019    Annually/Less than 100 mg/dl  Yes   Nephropathy screening Lab Results   Component Value Date    LABMICR 9.0 09/04/2019     Lab Results   Component Value Date    PROTEINUA Negative 06/05/2018    Annually Yes   Blood pressure BP Readings from Last 1 Encounters:   12/05/19 118/72    Less than 140/90 Yes   Dilated retinal exam : 10/14/2019 Annually Yes   Foot exam   : 06/06/2019 Annually Yes

## 2020-01-04 ENCOUNTER — DOCUMENTATION ONLY (OUTPATIENT)
Dept: FAMILY MEDICINE | Facility: CLINIC | Age: 60
End: 2020-01-04

## 2020-01-09 ENCOUNTER — CLINICAL SUPPORT (OUTPATIENT)
Dept: FAMILY MEDICINE | Facility: CLINIC | Age: 60
End: 2020-01-09
Payer: COMMERCIAL

## 2020-01-09 DIAGNOSIS — Z23 NEED FOR INFLUENZA VACCINATION: Primary | ICD-10-CM

## 2020-01-09 PROCEDURE — 90686 FLU VACCINE (QUAD) GREATER THAN OR EQUAL TO 3YO PRESERVATIVE FREE IM: ICD-10-PCS | Mod: S$GLB,,, | Performed by: FAMILY MEDICINE

## 2020-01-09 PROCEDURE — 99999 PR PBB SHADOW E&M-EST. PATIENT-LVL I: ICD-10-PCS | Mod: PBBFAC,,,

## 2020-01-09 PROCEDURE — 90471 IMMUNIZATION ADMIN: CPT | Mod: S$GLB,,, | Performed by: FAMILY MEDICINE

## 2020-01-09 PROCEDURE — 90686 IIV4 VACC NO PRSV 0.5 ML IM: CPT | Mod: S$GLB,,, | Performed by: FAMILY MEDICINE

## 2020-01-09 PROCEDURE — 90471 FLU VACCINE (QUAD) GREATER THAN OR EQUAL TO 3YO PRESERVATIVE FREE IM: ICD-10-PCS | Mod: S$GLB,,, | Performed by: FAMILY MEDICINE

## 2020-01-09 PROCEDURE — 99999 PR PBB SHADOW E&M-EST. PATIENT-LVL I: CPT | Mod: PBBFAC,,,

## 2020-03-03 ENCOUNTER — LAB VISIT (OUTPATIENT)
Dept: LAB | Facility: HOSPITAL | Age: 60
End: 2020-03-03
Attending: FAMILY MEDICINE
Payer: COMMERCIAL

## 2020-03-03 DIAGNOSIS — E11.65 TYPE 2 DIABETES MELLITUS WITH HYPERGLYCEMIA, WITHOUT LONG-TERM CURRENT USE OF INSULIN: ICD-10-CM

## 2020-03-03 LAB
ESTIMATED AVG GLUCOSE: 140 MG/DL (ref 68–131)
HBA1C MFR BLD HPLC: 6.5 % (ref 4–5.6)

## 2020-03-03 PROCEDURE — 36415 COLL VENOUS BLD VENIPUNCTURE: CPT | Mod: PO

## 2020-03-03 PROCEDURE — 83036 HEMOGLOBIN GLYCOSYLATED A1C: CPT

## 2020-03-04 NOTE — PROGRESS NOTES
Subjective:       Patient ID: Deanna Garcia is a 59 y.o. female.    Chief Complaint: Diabetes    Deanna is a 59 y.o. female who presents today for follow up on her multiple medical issues     A1c: stable. <7. Down from 6.8 to 6.7 to 6.5.   HTN: stable, off BB due to bradycardia  DLD: on statin  Smoking: smoking 6 cigs/week. Had quit. Restarted.   Weight: stable.     Labs as below reviewed in detail.     Review of Systems   Constitutional: Negative for chills, fatigue and fever.   Respiratory: Negative for shortness of breath.    Cardiovascular: Negative for chest pain.   Gastrointestinal: Negative for constipation, diarrhea, nausea and vomiting.   Endocrine: Negative for polydipsia, polyphagia and polyuria.   Musculoskeletal: Negative for myalgias.   Skin: Negative for rash.   Neurological: Negative for dizziness, weakness, light-headedness and headaches.              Results for orders placed or performed in visit on 03/03/20   Hemoglobin A1c   Result Value Ref Range    Hemoglobin A1C 6.5 (H) 4.0 - 5.6 %    Estimated Avg Glucose 140 (H) 68 - 131 mg/dL     Lab Results   Component Value Date    HGBA1C 6.5 (H) 03/03/2020    HGBA1C 6.7 (H) 12/04/2019    HGBA1C 6.8 (H) 09/04/2019     Lab Review   Lab Results   Component Value Date     09/04/2019    K 3.9 09/04/2019     09/04/2019    CO2 25 09/04/2019    BUN 17 09/04/2019    CREATININE 0.7 09/04/2019    CALCIUM 9.5 09/04/2019     Lab Results   Component Value Date    CHOL 117 (L) 09/04/2019    TRIG 43 09/04/2019    HDL 48 09/04/2019          Objective:     Vitals:    03/05/20 0814   BP: 126/68   Pulse: 75        Physical Exam   Constitutional: She is oriented to person, place, and time. She appears well-developed and well-nourished. No distress.   HENT:   Head: Normocephalic and atraumatic.   Eyes: Conjunctivae are normal.   Cardiovascular: Normal rate and regular rhythm.   Pulmonary/Chest: Effort normal and breath sounds normal.   Abdominal: Soft. There  is no tenderness.   Musculoskeletal: She exhibits no edema.   Neurological: She is alert and oriented to person, place, and time.   Skin: Skin is warm. She is not diaphoretic.   Psychiatric: She has a normal mood and affect. Her behavior is normal. Judgment and thought content normal.   Nursing note and vitals reviewed.      Assessment:       1. Type 2 diabetes mellitus with hyperglycemia, without long-term current use of insulin    2. Other hyperlipidemia    3. Coronary artery disease involving native coronary artery of native heart without angina pectoris    4. Diastolic dysfunction    5. History of coronary artery stent placement    6. BMI 20.0-20.9, adult    7. Tobacco abuse    8. Low vitamin D level    9. Light cigarette smoker (1-9 cigarettes per day)    10. Colon cancer screening        Plan:       Continue metformin 1000 mg twice daily  Continue atorvastatin 80 mg  Continue not taking plavix  STOP SMOKING AND DRINKING SODA!     F/u in 3 months. Patient moving to [place]. Can try to find a new PCP there or see me q6 months.      Will discuss again at next visit.     Type 2 diabetes mellitus with hyperglycemia, without long-term current use of insulin  -     metFORMIN (GLUCOPHAGE-XR) 500 MG XR 24hr tablet; Take 2 tablets (1,000 mg total) by mouth 2 (two) times daily with meals.  Dispense: 360 tablet; Refill: 1    Other hyperlipidemia  -     atorvastatin (LIPITOR) 80 MG tablet; Take 1 tablet (80 mg total) by mouth once daily.  Dispense: 90 tablet; Refill: 1    Coronary artery disease involving native coronary artery of native heart without angina pectoris  -     aspirin (ECOTRIN) 81 MG EC tablet; Take 1 tablet (81 mg total) by mouth once daily.; Refill: 0    Diastolic dysfunction    History of coronary artery stent placement  -     atorvastatin (LIPITOR) 80 MG tablet; Take 1 tablet (80 mg total) by mouth once daily.  Dispense: 90 tablet; Refill: 1    BMI 20.0-20.9, adult    Tobacco abuse  -     nicotine  (NICODERM CQ) 21 mg/24 hr; Place 1 patch onto the skin once daily.  Dispense: 28 patch; Refill: 0    Low vitamin D level  -     ergocalciferol (ERGOCALCIFEROL) 50,000 unit Cap; Take 1 capsule (50,000 Units total) by mouth every 7 days. Then start daily OTC replacement after this Rx is complete  Dispense: 12 capsule; Refill: 0    Light cigarette smoker (1-9 cigarettes per day)  -     nicotine (NICODERM CQ) 21 mg/24 hr; Place 1 patch onto the skin once daily.  Dispense: 28 patch; Refill: 0  -     nicotine polacrilex 2 MG Lozg; Take 1 lozenge (2 mg total) by mouth as needed.  Dispense: 72 lozenge; Refill: 0    Colon cancer screening  -     Fecal Immunochemical Test (iFOBT); Future; Expected date: 03/05/2020      Warning signs discussed, patient to call with any further issues or worsening of symptoms.

## 2020-03-05 ENCOUNTER — OFFICE VISIT (OUTPATIENT)
Dept: FAMILY MEDICINE | Facility: CLINIC | Age: 60
End: 2020-03-05
Payer: COMMERCIAL

## 2020-03-05 VITALS
SYSTOLIC BLOOD PRESSURE: 126 MMHG | BODY MASS INDEX: 19.69 KG/M2 | DIASTOLIC BLOOD PRESSURE: 68 MMHG | WEIGHT: 118.19 LBS | OXYGEN SATURATION: 98 % | HEART RATE: 75 BPM | HEIGHT: 65 IN

## 2020-03-05 DIAGNOSIS — F17.210 LIGHT CIGARETTE SMOKER (1-9 CIGARETTES PER DAY): ICD-10-CM

## 2020-03-05 DIAGNOSIS — E78.49 OTHER HYPERLIPIDEMIA: ICD-10-CM

## 2020-03-05 DIAGNOSIS — Z72.0 TOBACCO ABUSE: ICD-10-CM

## 2020-03-05 DIAGNOSIS — Z95.5 HISTORY OF CORONARY ARTERY STENT PLACEMENT: ICD-10-CM

## 2020-03-05 DIAGNOSIS — R79.89 LOW VITAMIN D LEVEL: ICD-10-CM

## 2020-03-05 DIAGNOSIS — E11.65 TYPE 2 DIABETES MELLITUS WITH HYPERGLYCEMIA, WITHOUT LONG-TERM CURRENT USE OF INSULIN: Primary | ICD-10-CM

## 2020-03-05 DIAGNOSIS — I51.89 DIASTOLIC DYSFUNCTION: ICD-10-CM

## 2020-03-05 DIAGNOSIS — Z12.11 COLON CANCER SCREENING: ICD-10-CM

## 2020-03-05 DIAGNOSIS — I25.10 CORONARY ARTERY DISEASE INVOLVING NATIVE CORONARY ARTERY OF NATIVE HEART WITHOUT ANGINA PECTORIS: ICD-10-CM

## 2020-03-05 PROCEDURE — 99214 PR OFFICE/OUTPT VISIT, EST, LEVL IV, 30-39 MIN: ICD-10-PCS | Mod: S$GLB,,, | Performed by: FAMILY MEDICINE

## 2020-03-05 PROCEDURE — 3008F PR BODY MASS INDEX (BMI) DOCUMENTED: ICD-10-PCS | Mod: CPTII,S$GLB,, | Performed by: FAMILY MEDICINE

## 2020-03-05 PROCEDURE — 3044F HG A1C LEVEL LT 7.0%: CPT | Mod: CPTII,S$GLB,, | Performed by: FAMILY MEDICINE

## 2020-03-05 PROCEDURE — 99999 PR PBB SHADOW E&M-EST. PATIENT-LVL III: CPT | Mod: PBBFAC,,, | Performed by: FAMILY MEDICINE

## 2020-03-05 PROCEDURE — 3008F BODY MASS INDEX DOCD: CPT | Mod: CPTII,S$GLB,, | Performed by: FAMILY MEDICINE

## 2020-03-05 PROCEDURE — 99214 OFFICE O/P EST MOD 30 MIN: CPT | Mod: S$GLB,,, | Performed by: FAMILY MEDICINE

## 2020-03-05 PROCEDURE — 3044F PR MOST RECENT HEMOGLOBIN A1C LEVEL <7.0%: ICD-10-PCS | Mod: CPTII,S$GLB,, | Performed by: FAMILY MEDICINE

## 2020-03-05 PROCEDURE — 99999 PR PBB SHADOW E&M-EST. PATIENT-LVL III: ICD-10-PCS | Mod: PBBFAC,,, | Performed by: FAMILY MEDICINE

## 2020-03-05 RX ORDER — IBUPROFEN 200 MG
1 TABLET ORAL DAILY
Qty: 28 PATCH | Refills: 0 | Status: SHIPPED | OUTPATIENT
Start: 2020-03-05

## 2020-03-05 RX ORDER — ATORVASTATIN CALCIUM 80 MG/1
80 TABLET, FILM COATED ORAL DAILY
Qty: 90 TABLET | Refills: 1 | Status: SHIPPED | OUTPATIENT
Start: 2020-03-05 | End: 2021-03-05

## 2020-03-05 RX ORDER — ERGOCALCIFEROL 1.25 MG/1
50000 CAPSULE ORAL
Qty: 12 CAPSULE | Refills: 0 | Status: SHIPPED | OUTPATIENT
Start: 2020-03-05

## 2020-03-05 RX ORDER — ASPIRIN 81 MG/1
81 TABLET ORAL DAILY
Refills: 0 | COMMUNITY
Start: 2020-03-05 | End: 2021-03-05

## 2020-03-05 RX ORDER — METFORMIN HYDROCHLORIDE 500 MG/1
1000 TABLET, EXTENDED RELEASE ORAL 2 TIMES DAILY WITH MEALS
Qty: 360 TABLET | Refills: 1 | Status: SHIPPED | OUTPATIENT
Start: 2020-03-05 | End: 2020-06-03

## 2020-03-05 RX ORDER — DM/P-EPHED/ACETAMINOPH/DOXYLAM 30-7.5/3
2 LIQUID (ML) ORAL
Qty: 72 LOZENGE | Refills: 0 | Status: SHIPPED | OUTPATIENT
Start: 2020-03-05

## 2020-03-05 NOTE — PATIENT INSTRUCTIONS
Continue metformin 1000 mg twice daily  Continue atorvastatin 80 mg  Continue not taking plavix  STOP SMOKING AND DRINKING SODA!     F/u in 3 months. Patient moving to [place]. Can try to find a new PCP there or see me q6 months.      Will discuss again at next visit.     Diabetes health maintenance:  -- advise regular and consistent glucose monitoring and medication compliance.  -- advise daily foot checks  -- advise yearly ophthalmologic exams  -- advise adequate dietary and exercise modification  -- advise regular dental visits  -- Medication management    Diabetes Management Status    Statin: Taking  ACE/ARB: Not taking    Screening or Prevention Patient's value Goal Complete/Controlled?   HgA1C Testing and Control   Lab Results   Component Value Date    HGBA1C 6.5 (H) 03/03/2020      Annually/Less than 8% Yes   Lipid profile : 09/04/2019 Annually Yes   LDL control Lab Results   Component Value Date    LDLCALC 60.4 (L) 09/04/2019    Annually/Less than 100 mg/dl  Yes   Nephropathy screening Lab Results   Component Value Date    LABMICR 9.0 09/04/2019     Lab Results   Component Value Date    PROTEINUA Negative 06/05/2018    Annually Yes   Blood pressure BP Readings from Last 1 Encounters:   03/05/20 126/68    Less than 140/90 Yes   Dilated retinal exam : 10/14/2019 Annually Yes   Foot exam   : 06/06/2019 Annually Yes

## 2020-03-06 ENCOUNTER — IMMUNIZATION (OUTPATIENT)
Dept: PHARMACY | Facility: CLINIC | Age: 60
End: 2020-03-06
Payer: COMMERCIAL

## 2020-03-07 ENCOUNTER — LAB VISIT (OUTPATIENT)
Dept: LAB | Facility: HOSPITAL | Age: 60
End: 2020-03-07
Attending: FAMILY MEDICINE
Payer: COMMERCIAL

## 2020-03-07 DIAGNOSIS — Z12.11 COLON CANCER SCREENING: ICD-10-CM

## 2020-03-07 PROCEDURE — 82274 ASSAY TEST FOR BLOOD FECAL: CPT

## 2020-03-11 LAB — HEMOCCULT STL QL IA: NEGATIVE

## 2020-03-26 ENCOUNTER — CLINICAL SUPPORT (OUTPATIENT)
Dept: SMOKING CESSATION | Facility: CLINIC | Age: 60
End: 2020-03-26
Payer: COMMERCIAL

## 2020-03-26 DIAGNOSIS — F17.200 NICOTINE DEPENDENCE: Primary | ICD-10-CM

## 2020-03-26 PROCEDURE — 99406 BEHAV CHNG SMOKING 3-10 MIN: CPT | Mod: S$GLB,,, | Performed by: INTERNAL MEDICINE

## 2020-03-26 PROCEDURE — 99406 PR TOBACCO USE CESSATION INTERMEDIATE 3-10 MINUTES: ICD-10-PCS | Mod: S$GLB,,, | Performed by: INTERNAL MEDICINE

## 2020-03-26 NOTE — PROGRESS NOTES
Spoke with patient today in regard to smoking cessation progress for 12 month telephone follow up, she states tobacco free. Commended patient on the accomplishment. Informed patient of benefit period and contact information if any further help or support is needed. Will resolve episode and complete smart form for Quit attempt #2.

## 2020-06-26 DIAGNOSIS — Z12.39 BREAST CANCER SCREENING: ICD-10-CM

## 2020-08-25 ENCOUNTER — PATIENT OUTREACH (OUTPATIENT)
Dept: ADMINISTRATIVE | Facility: HOSPITAL | Age: 60
End: 2020-08-25

## 2020-08-25 DIAGNOSIS — E11.65 TYPE 2 DIABETES MELLITUS WITH HYPERGLYCEMIA, WITHOUT LONG-TERM CURRENT USE OF INSULIN: Primary | ICD-10-CM

## 2020-08-25 NOTE — PROGRESS NOTES
audit performed. Telephone Outreach regarding mammogram, urine microalbumin- LVM - office # given

## 2020-09-11 DIAGNOSIS — E11.9 TYPE 2 DIABETES MELLITUS WITHOUT COMPLICATION: ICD-10-CM

## 2020-09-16 ENCOUNTER — PATIENT OUTREACH (OUTPATIENT)
Dept: ADMINISTRATIVE | Facility: HOSPITAL | Age: 60
End: 2020-09-16